# Patient Record
Sex: FEMALE | Race: WHITE | NOT HISPANIC OR LATINO | URBAN - METROPOLITAN AREA
[De-identification: names, ages, dates, MRNs, and addresses within clinical notes are randomized per-mention and may not be internally consistent; named-entity substitution may affect disease eponyms.]

---

## 2023-02-14 ENCOUNTER — ON CAMPUS - OUTPATIENT (OUTPATIENT)
Dept: URBAN - METROPOLITAN AREA HOSPITAL 51 | Facility: HOSPITAL | Age: 83
End: 2023-02-14

## 2023-02-14 DIAGNOSIS — K64.4 RESIDUAL HEMORRHOIDAL SKIN TAGS: ICD-10-CM

## 2023-02-14 DIAGNOSIS — D12.2 BENIGN NEOPLASM OF ASCENDING COLON: ICD-10-CM

## 2023-02-14 DIAGNOSIS — K57.30 DIVERTICULOSIS OF LARGE INTESTINE WITHOUT PERFORATION OR ABS: ICD-10-CM

## 2023-02-14 DIAGNOSIS — K44.9 DIAPHRAGMATIC HERNIA WITHOUT OBSTRUCTION OR GANGRENE: ICD-10-CM

## 2023-02-14 DIAGNOSIS — K64.8 OTHER HEMORRHOIDS: ICD-10-CM

## 2023-02-14 DIAGNOSIS — D64.9 ANEMIA, UNSPECIFIED: ICD-10-CM

## 2023-02-14 DIAGNOSIS — K29.60 OTHER GASTRITIS WITHOUT BLEEDING: ICD-10-CM

## 2023-02-14 DIAGNOSIS — K59.00 CONSTIPATION, UNSPECIFIED: ICD-10-CM

## 2023-02-14 PROCEDURE — 43239 EGD BIOPSY SINGLE/MULTIPLE: CPT | Mod: 51 | Performed by: INTERNAL MEDICINE

## 2023-02-14 PROCEDURE — 45385 COLONOSCOPY W/LESION REMOVAL: CPT | Performed by: INTERNAL MEDICINE

## 2023-02-15 PROBLEM — J30.89 NON-SEASONAL ALLERGIC RHINITIS: Status: ACTIVE | Noted: 2023-02-15

## 2023-02-15 PROBLEM — R13.10 DYSPHAGIA: Status: ACTIVE | Noted: 2023-02-15

## 2023-02-15 PROBLEM — E11.3512 TYPE 2 DIABETES MELLITUS WITH PROLIFERATIVE DIABETIC RETINOPATHY WITH MACULAR EDEMA, LEFT EYE (MULTI): Status: ACTIVE | Noted: 2017-01-24

## 2023-02-15 PROBLEM — D72.819 DECREASED WHITE BLOOD CELL COUNT: Status: ACTIVE | Noted: 2023-02-15

## 2023-02-15 PROBLEM — G25.81 RESTLESS LEGS: Status: ACTIVE | Noted: 2023-02-15

## 2023-02-15 PROBLEM — R79.89 LOW VITAMIN B12 LEVEL: Status: ACTIVE | Noted: 2023-02-15

## 2023-02-15 PROBLEM — E83.19 IRON EXCESS: Status: ACTIVE | Noted: 2023-02-15
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
## 2023-02-15 PROBLEM — I10 BENIGN ESSENTIAL HYPERTENSION: Status: ACTIVE | Noted: 2023-02-15

## 2023-02-15 PROBLEM — D49.2 NEOPLASM OF SKIN OF NOSE: Status: ACTIVE | Noted: 2023-02-15

## 2023-02-15 PROBLEM — M48.20 BAASTRUP'S SYNDROME: Status: ACTIVE | Noted: 2023-02-15

## 2023-02-15 PROBLEM — E53.8 VITAMIN B12 DEFICIENCY: Status: ACTIVE | Noted: 2023-02-15

## 2023-02-15 PROBLEM — R29.898: Status: ACTIVE | Noted: 2023-02-15

## 2023-02-15 PROBLEM — Z86.39 HISTORY OF IRON DEFICIENCY: Status: ACTIVE | Noted: 2023-02-15

## 2023-02-15 PROBLEM — U07.1 COVID-19 VIRUS INFECTION: Status: ACTIVE | Noted: 2023-02-15

## 2023-02-15 PROBLEM — D64.9 ANEMIA: Status: ACTIVE | Noted: 2023-02-15

## 2023-02-15 PROBLEM — R43.0 LOSS OF SENSE OF SMELL: Status: ACTIVE | Noted: 2023-02-15

## 2023-02-15 PROBLEM — J34.89 RHINORRHEA: Status: ACTIVE | Noted: 2023-02-15

## 2023-02-15 PROBLEM — G25.9 MOVEMENT DISORDER: Status: ACTIVE | Noted: 2023-02-15

## 2023-02-15 PROBLEM — R53.81 DEBILITY: Status: ACTIVE | Noted: 2023-02-15

## 2023-02-15 PROBLEM — R63.0 DECREASED APPETITE: Status: ACTIVE | Noted: 2023-02-15

## 2023-02-15 PROBLEM — G20.A1 PARKINSONS DISEASE (MULTI): Status: ACTIVE | Noted: 2023-02-15

## 2023-02-15 PROBLEM — J30.9 ALLERGIC RHINITIS: Status: ACTIVE | Noted: 2023-02-15

## 2023-02-15 PROBLEM — E88.09 PROTEINS SERUM PLASMA LOW: Status: ACTIVE | Noted: 2023-02-15

## 2023-02-15 PROBLEM — R26.89 BALANCE PROBLEM: Status: ACTIVE | Noted: 2023-02-15

## 2023-02-15 PROBLEM — R09.89 DECREASED PULSES IN FEET: Status: ACTIVE | Noted: 2023-02-15

## 2023-02-15 PROBLEM — E78.5 HYPERLIPIDEMIA: Status: ACTIVE | Noted: 2023-02-15

## 2023-02-15 PROBLEM — I47.19 PAROXYSMAL SINUS TACHYCARDIA (CMS-HCC): Status: ACTIVE | Noted: 2023-02-15

## 2023-02-15 PROBLEM — R29.898 WEAKNESS OF LEFT ARM: Status: ACTIVE | Noted: 2023-02-15

## 2023-02-15 PROBLEM — G47.8 AWAKENS FROM SLEEP AT NIGHT: Status: ACTIVE | Noted: 2023-02-15

## 2023-02-15 PROBLEM — R29.2 BABINSKI REFLEX: Status: ACTIVE | Noted: 2023-02-15
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
## 2023-02-15 PROBLEM — E11.42 TYPE 2 DIABETES MELLITUS WITH DIABETIC POLYNEUROPATHY, WITHOUT LONG-TERM CURRENT USE OF INSULIN (MULTI): Status: ACTIVE | Noted: 2023-02-15

## 2023-02-15 PROBLEM — K59.09 CHRONIC CONSTIPATION: Status: ACTIVE | Noted: 2023-02-15

## 2023-02-15 PROBLEM — E55.9 VITAMIN D DEFICIENCY: Status: ACTIVE | Noted: 2023-02-15

## 2023-02-15 PROBLEM — M54.50 THORACOLUMBAR BACK PAIN: Status: ACTIVE | Noted: 2023-02-15

## 2023-02-15 PROBLEM — R29.898 LEFT HAND WEAKNESS: Status: ACTIVE | Noted: 2023-02-15

## 2023-02-15 PROBLEM — F32.A MILD DEPRESSION: Status: ACTIVE | Noted: 2023-02-15

## 2023-02-15 PROBLEM — M85.852 OSTEOPENIA OF NECK OF LEFT FEMUR: Status: ACTIVE | Noted: 2023-02-15

## 2023-02-15 PROBLEM — M54.6 THORACOLUMBAR BACK PAIN: Status: ACTIVE | Noted: 2023-02-15

## 2023-02-15 PROBLEM — G62.9 PERIPHERAL NEUROPATHY: Status: ACTIVE | Noted: 2023-02-15

## 2023-02-15 PROBLEM — K62.5 RECTAL BLEEDING: Status: ACTIVE | Noted: 2023-02-15

## 2023-02-15 PROBLEM — L60.3 DYSTROPHIC NAIL: Status: ACTIVE | Noted: 2023-02-15

## 2023-02-15 PROBLEM — D49.2 NEOPLASM OF SKIN OF SCALP: Status: ACTIVE | Noted: 2023-02-15

## 2023-02-15 RX ORDER — ATORVASTATIN CALCIUM 20 MG/1
1 TABLET, FILM COATED ORAL DAILY
COMMUNITY
End: 2023-07-06 | Stop reason: SDUPTHER

## 2023-02-15 RX ORDER — FERROUS GLUCONATE 324(38)MG
TABLET ORAL
COMMUNITY
Start: 2021-09-28

## 2023-02-15 RX ORDER — UBIDECARENONE 75 MG
1 CAPSULE ORAL DAILY
COMMUNITY
Start: 2022-12-19

## 2023-02-15 RX ORDER — VIT C/E/ZN/COPPR/LUTEIN/ZEAXAN 250MG-90MG
25 CAPSULE ORAL DAILY
COMMUNITY
Start: 2022-12-19

## 2023-02-15 RX ORDER — TRAMADOL HYDROCHLORIDE 50 MG/1
50 TABLET ORAL
COMMUNITY
Start: 2017-01-03

## 2023-02-15 RX ORDER — MIRTAZAPINE 7.5 MG/1
7.5 TABLET, FILM COATED ORAL NIGHTLY
COMMUNITY
Start: 2022-12-19

## 2023-02-15 RX ORDER — PANTOPRAZOLE SODIUM 40 MG/1
1 TABLET, DELAYED RELEASE ORAL DAILY
COMMUNITY
Start: 2022-12-19 | End: 2023-03-28 | Stop reason: WASHOUT

## 2023-02-15 RX ORDER — GABAPENTIN 300 MG/1
300 CAPSULE ORAL DAILY
COMMUNITY

## 2023-02-15 RX ORDER — CARBIDOPA AND LEVODOPA 25; 100 MG/1; MG/1
2 TABLET ORAL
COMMUNITY
Start: 2020-12-08

## 2023-03-07 ENCOUNTER — TELEPHONE (OUTPATIENT)
Dept: PRIMARY CARE | Facility: CLINIC | Age: 83
End: 2023-03-07

## 2023-03-07 NOTE — TELEPHONE ENCOUNTER
Day  the patient's physical therapist reports that Mela has swelling in all her extremities.  Also has some constipation

## 2023-03-08 ENCOUNTER — TELEPHONE (OUTPATIENT)
Dept: PRIMARY CARE | Facility: CLINIC | Age: 83
End: 2023-03-08
Payer: MEDICARE

## 2023-03-08 NOTE — TELEPHONE ENCOUNTER
Bhavya with [facility] called to update you.  They have been seeing her for [facility] and she just did a nursing eval with her for a sore on her bottom.  She reports this is the same Stage 2 ulcer she has had chronically on the tip of her coccyx.  She reports it is dry and tender.  She does not think a dressing will stay on it d/t the location.  They will be applying barrier TID and encourage her to reposition more frequently.  They are also adding a couple of nursing visits to see her.

## 2023-03-08 NOTE — TELEPHONE ENCOUNTER
I called her. Tiffany's number was not given. Pt reports that her feet have been swollen for about a week. Denies SOB, wheezing or other complaints other than being constipated as well for the same amount of time. Pt has apt 3/28

## 2023-03-22 ENCOUNTER — TELEPHONE (OUTPATIENT)
Dept: PRIMARY CARE | Facility: CLINIC | Age: 83
End: 2023-03-22
Payer: MEDICARE

## 2023-03-22 NOTE — TELEPHONE ENCOUNTER
Tanya RN from [facility], would like to start a Duoderm regimen with the pt after eval today. She needs an okay to do these wound care visits. Okay to send?

## 2023-03-27 NOTE — PROGRESS NOTES
Subjective   Patient ID: Mela Kumari is a 82 y.o. female who presents for Hospital Follow-up (Pt here for hospital follow up. Pt still has [facility] nurses coming out to see her at home. She is still getting treatment for her decubitus ulcer as well with wound care.).  LAST VISIT PLAN 1/23/23  MEDICATIONS:no changes in medications    INSTRUCTIONS:agree with home health and glad your family and friends are helping you.  keep vaseline and telfa pad with a sock over it on left foot for blister area to protect it.    TESTING:urine test next visit. we ganesh blood work today.  Dr Schaeffer has blood work orders in your chart, we were not able to draw those today.    REFERRALS:podiatry for diabetic foot and nail care    FOLLOW UP WITH DR. DÍAZ:  Next visit:  2 months  Review swollen legs at visit march 28. Elevating legs she states and is going to try compression socks.   END INFO FROM PRIOR VISIT  HPI  Here for follow up on htn, dependent leg edema, debility, sacral decub ulcer noted by home health nurse. Poor appetite, depresison, dm2.med management. Sees neuro for parkinsons. Home environment worrisome in that she lives alone. She said her son will be moving in with her and another son stops in every night after work. States she is eating ok.she thinks a little better.  Weight remains low.  Today bp is low  She is not on any antihypertensive medication.  She is also not on tramadol which she had from rheum for her back pain, she notes she is going to contact them.  She is on remeron at night dn she is slieeping ok.-this was also to help her appetitie  For anemia she is on iron    No gi or gu c/o. Med list ported in protonix and omeprazole. She is not taking both. Med list problem list and histories reviewed.    Diabetes:Type II:Is taking medications regularly, denies side effects. Januvia 50mg.  Denies hypoglycemia, polyuria, polydipsia.  No new numbness or paresthesias of extremities.No syncope or dizziness.No blurred  "vision.  Lab Results   Component Value Date    HGBA1C 5.9 (A) 05/24/2022    HGBA1C 6.2 (A) 02/22/2022     Lab Results   Component Value Date    CREATININE 0.99 03/28/2023         Lab Results   Component Value Date    GLUCOSE 116 (H) 03/28/2023    CALCIUM 9.1 03/28/2023     03/28/2023    K 4.9 03/28/2023    CO2 26 03/28/2023     (H) 03/28/2023    BUN 36 (H) 03/28/2023    CREATININE 0.99 03/28/2023      Her A1c was 6.9 in January.       Scribe Transcription:  She reports swelling in her feet on going including for the past week. We discussed she should wear compression socks. Nursing can help put them on some.Sleeping in the recliner.admits feet are not up high enough    Anemia work up and low weight She had a HH and small colon polyp otherwise everything was fine on her EGD and colonoscopy. This was on 2/14/23.     We reviewed her medications. Dr. Rice wants her just on omeprazole so I will change that on her med list. She is not taking 2 ppi's    No sob palpitations chest pain  dizziness.She had an echo in May 2022 that showed normal systolic function. She had a CT chest in November that was negative.     She states she got a covid booster recently on March 7.       Objective   Lab Results   Component Value Date    WBC 5.3 03/28/2023    HGB 11.2 (L) 03/28/2023    HCT 36.2 03/28/2023     03/28/2023    CHOL 205 (H) 02/22/2022    TRIG 61 02/22/2022    HDL 82.6 02/22/2022    ALT 11 03/28/2023    AST 25 03/28/2023     03/28/2023    K 4.9 03/28/2023     (H) 03/28/2023    CREATININE 0.99 03/28/2023    BUN 36 (H) 03/28/2023    CO2 26 03/28/2023    TSH 1.95 03/28/2023    HGBA1C 5.9 (A) 05/24/2022     par  Physical ExamBP 88/54 (BP Location: Left arm, Patient Position: Sitting, BP Cuff Size: Adult)   Pulse 84   Resp 16   Ht 1.499 m (4' 11\")   Wt 48.1 kg (106 lb)   BMI 21.41 kg/m²       6/7/2022     4:27 PM 6/7/2022     4:36 PM 7/26/2022     8:31 AM 10/17/2022    11:33 AM 10/27/2022     " "3:04 PM 12/19/2022     5:07 PM 3/28/2023    11:53 AM   Vitals   Systolic 153 148 151 116 108 123 88   Diastolic 73 73 77 82 61 79 54   Heart Rate 87 82 96 92 116 89 84   Temp   36.2 °C (97.1 °F)  37.1 °C (98.7 °F)     Resp   18 18 16 20 16   Height (in)   1.549 m (5' 1\")  1.549 m (5' 1\") 1.549 m (5' 1\") 1.499 m (4' 11\")   Weight (lb) 104.25  109.38 102 102 105.8 106   BMI 20.36 kg/m2  20.67 kg/m2 19.27 kg/m2 19.27 kg/m2 19.99 kg/m2 21.41 kg/m2   BSA (m2) 1.42 m2  1.46 m2 1.41 m2 1.41 m2 1.44 m2 1.42 m2   Visit Report       Report       Patient looks frail speaks slowly using walker is here by herself. Did not drive here however.  HEENT:   Normocephalic, no facial asymmetry  Eyes: Sclera and conjunctiva are clear.  Neck: No adenopathy cervical (Ant/post/lat), no Supraclavicular nodes.   Thyroid normal.   Carotid pulses normal, no carotid bruits.  Lungs : RR normal. Clear to auscultation anterior, posterior and lateral. No rales, wheezes rhonchi or rubs. Good air exchange.  Heart: RRR. No Murmur, gallop, click or rub.  Vascular:  Posterior tibialis and dorsalis pedis pulses 1+ bilaterally.   Extremities: no upper extremity edema.   Has 1-2+edema lower legs and ankles  .Musculoskeletal: no synovitis of joints seen. No new deformity.  Neuro: CN 2-12 intact. Alert, appropriate.   Ambulation-  Slow to move, using walker, slow to speak. No tremors. Typical of her parkinsons.  Psych: normal mood and flat affect.  Skin: No rash, bruising petechiae or jaundice. 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 Hair is thin and scalp can be visualized.  Has shallow sacral ulcer top of gluteal crease without cellulitis. No odor. Edges appear to be healing slightly. Discussed not sitting on this area.  .feet swollen but no lesions or skin breakdown. Has neuropathy  Negative homans.          Assessment/Plan   Problem List " Items Addressed This Visit          Nervous    Peripheral neuropathy       Circulatory    Benign essential hypertension       Endocrine/Metabolic    Type 2 diabetes mellitus with proliferative diabetic retinopathy with macular edema, left eye (CMS/HCC)    Relevant Medications    SITagliptin phosphate (Januvia) 50 mg tablet       Hematologic    Low vitamin B12 level-supplement and monitor levels       Other    Mild depression-stable on meds -pt notes ok    Proteins serum plasma low son bringing meals, encouraged to increase      Other Visit Diagnoses       Edema of both lower legs    -  Primary    Relevant Orders    CBC and Auto Differential (Completed)    Comprehensive metabolic panel (Completed)    TSH with reflex to Free T4 if abnormal (Completed)    Hypotension, unspecified hypotension type    -eiology unclear, will have bp checked at home. She is not symptomatic with this.cl labs/    Relevant Orders    CBC and Auto Differential (Completed)    Comprehensive metabolic panel (Completed)    TSH with reflex to Free T4 if abnormal (Completed)    Hiatal hernia        Relevant Medications    omeprazole OTC (PriLOSEC OTC) 20 mg EC tablet     Decubitus ulcer sacral, not infected on exam today, I have ordered wound care to go out to the house and they are seeing her.   See patient instructions for additional treatment plan.

## 2023-03-28 ENCOUNTER — OFFICE VISIT (OUTPATIENT)
Dept: PRIMARY CARE | Facility: CLINIC | Age: 83
End: 2023-03-28
Payer: MEDICARE

## 2023-03-28 ENCOUNTER — LAB (OUTPATIENT)
Dept: LAB | Facility: LAB | Age: 83
End: 2023-03-28
Payer: MEDICARE

## 2023-03-28 VITALS
WEIGHT: 106 LBS | HEART RATE: 84 BPM | DIASTOLIC BLOOD PRESSURE: 54 MMHG | BODY MASS INDEX: 21.37 KG/M2 | SYSTOLIC BLOOD PRESSURE: 88 MMHG | RESPIRATION RATE: 16 BRPM | HEIGHT: 59 IN

## 2023-03-28 DIAGNOSIS — K44.9 HIATAL HERNIA: ICD-10-CM

## 2023-03-28 DIAGNOSIS — I10 BENIGN ESSENTIAL HYPERTENSION: ICD-10-CM

## 2023-03-28 DIAGNOSIS — R60.0 EDEMA OF BOTH LOWER LEGS: Primary | ICD-10-CM

## 2023-03-28 DIAGNOSIS — F32.A MILD DEPRESSION: ICD-10-CM

## 2023-03-28 DIAGNOSIS — L89.159 PRESSURE INJURY OF SKIN OF SACRAL REGION, UNSPECIFIED INJURY STAGE: ICD-10-CM

## 2023-03-28 DIAGNOSIS — R79.89 LOW VITAMIN B12 LEVEL: ICD-10-CM

## 2023-03-28 DIAGNOSIS — I95.9 HYPOTENSION, UNSPECIFIED HYPOTENSION TYPE: ICD-10-CM

## 2023-03-28 DIAGNOSIS — E11.3512 TYPE 2 DIABETES MELLITUS WITH LEFT EYE AFFECTED BY PROLIFERATIVE RETINOPATHY AND MACULAR EDEMA, WITHOUT LONG-TERM CURRENT USE OF INSULIN (MULTI): ICD-10-CM

## 2023-03-28 DIAGNOSIS — R60.0 EDEMA OF BOTH LOWER LEGS: ICD-10-CM

## 2023-03-28 DIAGNOSIS — E44.1 MILD PROTEIN-CALORIE MALNUTRITION (MULTI): ICD-10-CM

## 2023-03-28 DIAGNOSIS — G62.89 OTHER POLYNEUROPATHY: ICD-10-CM

## 2023-03-28 DIAGNOSIS — E88.09 PROTEINS SERUM PLASMA LOW: ICD-10-CM

## 2023-03-28 DIAGNOSIS — G20.A1 PARKINSONS DISEASE (MULTI): ICD-10-CM

## 2023-03-28 PROCEDURE — 84443 ASSAY THYROID STIM HORMONE: CPT

## 2023-03-28 PROCEDURE — 99215 OFFICE O/P EST HI 40 MIN: CPT | Performed by: INTERNAL MEDICINE

## 2023-03-28 PROCEDURE — 36415 COLL VENOUS BLD VENIPUNCTURE: CPT

## 2023-03-28 PROCEDURE — 1160F RVW MEDS BY RX/DR IN RCRD: CPT | Performed by: INTERNAL MEDICINE

## 2023-03-28 PROCEDURE — 1036F TOBACCO NON-USER: CPT | Performed by: INTERNAL MEDICINE

## 2023-03-28 PROCEDURE — 85025 COMPLETE CBC W/AUTO DIFF WBC: CPT

## 2023-03-28 PROCEDURE — 3078F DIAST BP <80 MM HG: CPT | Performed by: INTERNAL MEDICINE

## 2023-03-28 PROCEDURE — 3074F SYST BP LT 130 MM HG: CPT | Performed by: INTERNAL MEDICINE

## 2023-03-28 PROCEDURE — 80053 COMPREHEN METABOLIC PANEL: CPT

## 2023-03-28 PROCEDURE — 1159F MED LIST DOCD IN RCRD: CPT | Performed by: INTERNAL MEDICINE

## 2023-03-28 RX ORDER — CYANOCOBALAMIN (VITAMIN B-12) 1000MCG/ML
1 DROPS ORAL NIGHTLY PRN
COMMUNITY
End: 2023-07-06 | Stop reason: ALTCHOICE

## 2023-03-28 RX ORDER — OMEPRAZOLE 20 MG/1
20 TABLET, DELAYED RELEASE ORAL
COMMUNITY
End: 2023-03-28 | Stop reason: SDUPTHER

## 2023-03-28 RX ORDER — OMEPRAZOLE 20 MG/1
20 TABLET, DELAYED RELEASE ORAL DAILY
Qty: 90 TABLET | Refills: 1 | Status: SHIPPED | OUTPATIENT
Start: 2023-03-28

## 2023-03-28 ASSESSMENT — PAIN SCALES - GENERAL: PAINLEVEL: 0-NO PAIN

## 2023-03-28 NOTE — PATIENT INSTRUCTIONS
Stay on the Januvia for your diabetes.  For your stomach you should be on omeprazole 20mg once per day. You are not taking pantoprazole.    Keep up with wound care. Lay on your side more than your back when you can.  Glad that your endoscopy and colonoscopy did not show anything worrisome.    ELEVATE legs above level of heart for 20-30 minutes twice per day.  Wear knee high compression socks.    Your blood pressure is too low to start a water pill.  Blood test today to check to see why your blood pressure is low.  Nurse to recheck blood pressure and let us know what it is.    Appt. In 3-6 weeks to check on weight and decubitus ulcer.

## 2023-03-29 LAB
ALANINE AMINOTRANSFERASE (SGPT) (U/L) IN SER/PLAS: 11 U/L (ref 7–45)
ALBUMIN (G/DL) IN SER/PLAS: 3.9 G/DL (ref 3.4–5)
ALKALINE PHOSPHATASE (U/L) IN SER/PLAS: 105 U/L (ref 33–136)
ANION GAP IN SER/PLAS: 12 MMOL/L (ref 10–20)
ASPARTATE AMINOTRANSFERASE (SGOT) (U/L) IN SER/PLAS: 25 U/L (ref 9–39)
BASOPHILS (10*3/UL) IN BLOOD BY AUTOMATED COUNT: 0.02 X10E9/L (ref 0–0.1)
BASOPHILS/100 LEUKOCYTES IN BLOOD BY AUTOMATED COUNT: 0.4 % (ref 0–2)
BILIRUBIN TOTAL (MG/DL) IN SER/PLAS: 0.3 MG/DL (ref 0–1.2)
CALCIUM (MG/DL) IN SER/PLAS: 9.1 MG/DL (ref 8.6–10.6)
CARBON DIOXIDE, TOTAL (MMOL/L) IN SER/PLAS: 26 MMOL/L (ref 21–32)
CHLORIDE (MMOL/L) IN SER/PLAS: 111 MMOL/L (ref 98–107)
CREATININE (MG/DL) IN SER/PLAS: 0.99 MG/DL (ref 0.5–1.05)
EOSINOPHILS (10*3/UL) IN BLOOD BY AUTOMATED COUNT: 0.11 X10E9/L (ref 0–0.4)
EOSINOPHILS/100 LEUKOCYTES IN BLOOD BY AUTOMATED COUNT: 2.1 % (ref 0–6)
ERYTHROCYTE DISTRIBUTION WIDTH (RATIO) BY AUTOMATED COUNT: 14 % (ref 11.5–14.5)
ERYTHROCYTE MEAN CORPUSCULAR HEMOGLOBIN CONCENTRATION (G/DL) BY AUTOMATED: 30.9 G/DL (ref 32–36)
ERYTHROCYTE MEAN CORPUSCULAR VOLUME (FL) BY AUTOMATED COUNT: 101 FL (ref 80–100)
ERYTHROCYTES (10*6/UL) IN BLOOD BY AUTOMATED COUNT: 3.6 X10E12/L (ref 4–5.2)
GFR FEMALE: 57 ML/MIN/1.73M2
GLUCOSE (MG/DL) IN SER/PLAS: 116 MG/DL (ref 74–99)
HEMATOCRIT (%) IN BLOOD BY AUTOMATED COUNT: 36.2 % (ref 36–46)
HEMOGLOBIN (G/DL) IN BLOOD: 11.2 G/DL (ref 12–16)
IMMATURE GRANULOCYTES/100 LEUKOCYTES IN BLOOD BY AUTOMATED COUNT: 0.2 % (ref 0–0.9)
LEUKOCYTES (10*3/UL) IN BLOOD BY AUTOMATED COUNT: 5.3 X10E9/L (ref 4.4–11.3)
LYMPHOCYTES (10*3/UL) IN BLOOD BY AUTOMATED COUNT: 1.7 X10E9/L (ref 0.8–3)
LYMPHOCYTES/100 LEUKOCYTES IN BLOOD BY AUTOMATED COUNT: 31.9 % (ref 13–44)
MONOCYTES (10*3/UL) IN BLOOD BY AUTOMATED COUNT: 0.47 X10E9/L (ref 0.05–0.8)
MONOCYTES/100 LEUKOCYTES IN BLOOD BY AUTOMATED COUNT: 8.8 % (ref 2–10)
NEUTROPHILS (10*3/UL) IN BLOOD BY AUTOMATED COUNT: 3.02 X10E9/L (ref 1.6–5.5)
NEUTROPHILS/100 LEUKOCYTES IN BLOOD BY AUTOMATED COUNT: 56.6 % (ref 40–80)
NRBC (PER 100 WBCS) BY AUTOMATED COUNT: 0 /100 WBC (ref 0–0)
PLATELETS (10*3/UL) IN BLOOD AUTOMATED COUNT: 272 X10E9/L (ref 150–450)
POTASSIUM (MMOL/L) IN SER/PLAS: 4.9 MMOL/L (ref 3.5–5.3)
PROTEIN TOTAL: 6.2 G/DL (ref 6.4–8.2)
SODIUM (MMOL/L) IN SER/PLAS: 144 MMOL/L (ref 136–145)
THYROTROPIN (MIU/L) IN SER/PLAS BY DETECTION LIMIT <= 0.05 MIU/L: 1.95 MIU/L (ref 0.44–3.98)
UREA NITROGEN (MG/DL) IN SER/PLAS: 36 MG/DL (ref 6–23)

## 2023-04-08 PROBLEM — R53.1 GENERALIZED WEAKNESS: Status: ACTIVE | Noted: 2022-11-06

## 2023-04-08 PROBLEM — M65.4 RADIAL STYLOID TENOSYNOVITIS: Status: RESOLVED | Noted: 2023-04-08 | Resolved: 2023-04-08

## 2023-04-08 PROBLEM — R63.0 ANOREXIA: Status: ACTIVE | Noted: 2022-11-07

## 2023-04-08 PROBLEM — M25.50 ARTHRALGIA: Status: RESOLVED | Noted: 2023-04-08 | Resolved: 2023-04-08

## 2023-04-08 PROBLEM — I47.19 PAROXYSMAL SINUS TACHYCARDIA (CMS-HCC): Status: RESOLVED | Noted: 2023-02-15 | Resolved: 2023-04-08

## 2023-04-08 PROBLEM — E78.5 HLD (HYPERLIPIDEMIA): Status: ACTIVE | Noted: 2022-09-08

## 2023-04-08 PROBLEM — U07.1 COVID-19 VIRUS INFECTION: Status: RESOLVED | Noted: 2023-02-15 | Resolved: 2023-04-08

## 2023-04-08 PROBLEM — K21.9 GERD (GASTROESOPHAGEAL REFLUX DISEASE): Status: ACTIVE | Noted: 2022-09-08

## 2023-04-08 PROBLEM — M89.9 DISORDER OF BONE, UNSPECIFIED: Status: ACTIVE | Noted: 2023-04-08

## 2023-04-08 PROBLEM — E46 PROTEIN-CALORIE MALNUTRITION (MULTI): Status: RESOLVED | Noted: 2022-11-07 | Resolved: 2023-04-08

## 2023-04-08 PROBLEM — M70.52 BURSITIS OF LEFT KNEE: Status: RESOLVED | Noted: 2023-04-08 | Resolved: 2023-04-08

## 2023-04-08 PROBLEM — G20.A1 PARKINSON DISEASE (MULTI): Status: ACTIVE | Noted: 2022-09-08

## 2023-04-08 PROBLEM — R63.4 ABNORMAL WEIGHT LOSS: Status: ACTIVE | Noted: 2023-04-08

## 2023-04-08 PROBLEM — R07.9 CHEST PAIN: Status: RESOLVED | Noted: 2022-11-07 | Resolved: 2023-04-08

## 2023-04-08 PROBLEM — I10 HTN (HYPERTENSION): Status: RESOLVED | Noted: 2022-09-08 | Resolved: 2023-04-08

## 2023-04-08 NOTE — RESULT ENCOUNTER NOTE
Call pt. Thyroid blood test good. Chemistries are ok, mildly dehydrated, drink more liquids. Anemia is mild and stable, same as in January.   Would repeat blood test in 3-4 months cbc bmp.

## 2023-05-16 ENCOUNTER — DOCUMENTATION (OUTPATIENT)
Dept: PRIMARY CARE | Facility: CLINIC | Age: 83
End: 2023-05-16

## 2023-05-16 ENCOUNTER — TELEPHONE (OUTPATIENT)
Dept: PRIMARY CARE | Facility: CLINIC | Age: 83
End: 2023-05-16

## 2023-05-16 RX ORDER — POLYETHYLENE GLYCOL 3350 17 G/17G
17 POWDER, FOR SOLUTION ORAL
Qty: 510 G | Refills: 2 | COMMUNITY
Start: 2023-04-28 | End: 2023-07-06 | Stop reason: ALTCHOICE

## 2023-05-16 RX ORDER — ACETAMINOPHEN 325 MG/1
2 TABLET ORAL EVERY 6 HOURS PRN
COMMUNITY
Start: 2023-04-27

## 2023-05-16 NOTE — PROGRESS NOTES
Discharge Facility: [facility]  Discharge Diagnosis: UTI (urinary tract infection); generalized weakness; Parkinson's disease  Admission Date: 4/28/2023  Discharge Date: 5/15/2023    PCP Appointment Date: 5/16/2023  Specialist Appointment Date: DALE  Hospital Encounter and Summary: Linked (hospital event before SNF)  This patient has a follow up scheduled with PCP within 2 business days of DC on 5/16/2023 which counts as outreach after discharge.

## 2023-05-16 NOTE — TELEPHONE ENCOUNTER
Patient calling,was not feeling well,slept through her appt time. Asking for a new appt. Do not see anything workable  through the next month. How to address.

## 2023-05-23 ENCOUNTER — APPOINTMENT (OUTPATIENT)
Dept: PRIMARY CARE | Facility: CLINIC | Age: 83
End: 2023-05-23
Payer: MEDICARE

## 2023-05-25 ENCOUNTER — PATIENT OUTREACH (OUTPATIENT)
Dept: PRIMARY CARE | Facility: CLINIC | Age: 83
End: 2023-05-25
Payer: MEDICARE

## 2023-05-25 NOTE — PROGRESS NOTES
Discharge Facility: [facility]  Discharge Diagnosis: generalized weakness  Admission Date: 5/17/23  Discharge Date: 5/23/23    PCP Appointment Date: TBD  Specialist Appointment Date: N/A  Hospital Encounter and Summary: Linked      2 attempts were made to reach patient to assess needs.   No return call as of this note.

## 2023-05-26 ENCOUNTER — TELEPHONE (OUTPATIENT)
Dept: PRIMARY CARE | Facility: CLINIC | Age: 83
End: 2023-05-26
Payer: MEDICARE

## 2023-05-26 DIAGNOSIS — N39.0 URINARY TRACT INFECTION, ACUTE: ICD-10-CM

## 2023-05-26 DIAGNOSIS — Z97.8 FOLEY CATHETER IN PLACE: Primary | ICD-10-CM

## 2023-05-26 NOTE — TELEPHONE ENCOUNTER
"I called the pt to notify her of the referral and reached a vm message stating the mailbox is full.  I called \"work\" number listed and reached re cording for [facility] (I did not leave a message)  "

## 2023-05-26 NOTE — TELEPHONE ENCOUNTER
"   Nurse Jenny of [facility] called regarding the pt.  Pt has been discharged with a urine catheter for \"urinary retention. She has an uti.\"   She stated.  There is no urologist in the discharge papers.   \"Only a PCP, PA and neurologist. \"   Jenny needs orders for catheter care. Or a urologist the pt may see or a referral to an urologist.   Call Jenny at .   "

## 2023-05-30 ENCOUNTER — TELEPHONE (OUTPATIENT)
Dept: PRIMARY CARE | Facility: CLINIC | Age: 83
End: 2023-05-30
Payer: MEDICARE

## 2023-05-30 NOTE — TELEPHONE ENCOUNTER
I called the pt, notified her and gave her the number to call and  schedule an appoint with urology. Pt verbalized understanding.

## 2023-05-30 NOTE — TELEPHONE ENCOUNTER
JENNIFER Palacios from [facility] calls in. She was at pt home today for follow up and RN visit. Pt told her that she pulled her catheter and wants to know if she still should follow up with URO, also, need order for social work to come to the home and get her some help. She also needs an apt.

## 2023-06-01 ENCOUNTER — TELEPHONE (OUTPATIENT)
Dept: PRIMARY CARE | Facility: CLINIC | Age: 83
End: 2023-06-01
Payer: MEDICARE

## 2023-06-01 NOTE — TELEPHONE ENCOUNTER
RN from [facility] will be seeing pt and will give her appt info-can you please add ehr at 8am on June 20th for KMKACEY

## 2023-06-09 ENCOUNTER — PATIENT OUTREACH (OUTPATIENT)
Dept: PRIMARY CARE | Facility: CLINIC | Age: 83
End: 2023-06-09
Payer: MEDICARE

## 2023-06-09 NOTE — PROGRESS NOTES
Call regarding hospitalization.  Patient has not had follow up with PCP at this time. Has appointment 6/20/23.   At time of outreach call the patient feels as if their condition has improved since last visit.  Reviewed the PCP appointment with the pt and addressed any questions or concerns.

## 2023-06-28 ENCOUNTER — PATIENT OUTREACH (OUTPATIENT)
Dept: PRIMARY CARE | Facility: CLINIC | Age: 83
End: 2023-06-28
Payer: MEDICARE

## 2023-06-28 ENCOUNTER — DOCUMENTATION (OUTPATIENT)
Dept: PRIMARY CARE | Facility: CLINIC | Age: 83
End: 2023-06-28
Payer: MEDICARE

## 2023-06-28 NOTE — PROGRESS NOTES
Discharge Facility: [facility]  Discharge Diagnosis: Generalized weakness  Admission Date: 6/14/2023  Discharge Date:  6/26/2023    PCP Appointment Date: 7/24 outside of rec 14 days after discharge  Specialist Appointment Date: TBD  Hospital Encounter and Summary: Linked  Unable to reach patient during two business days after discharge despite at least two attempts made.

## 2023-07-05 ENCOUNTER — TELEPHONE (OUTPATIENT)
Dept: PRIMARY CARE | Facility: CLINIC | Age: 83
End: 2023-07-05
Payer: MEDICARE

## 2023-07-05 ENCOUNTER — DOCUMENTATION (OUTPATIENT)
Dept: PRIMARY CARE | Facility: CLINIC | Age: 83
End: 2023-07-05
Payer: MEDICARE

## 2023-07-05 RX ORDER — CEFDINIR 300 MG/1
300 CAPSULE ORAL 2 TIMES DAILY
Qty: 4 CAPSULE | Refills: 0 | COMMUNITY
Start: 2023-07-03 | End: 2023-07-06 | Stop reason: ALTCHOICE

## 2023-07-05 RX ORDER — FEEDING PUMP
8 EACH MISCELLANEOUS
COMMUNITY
Start: 2023-06-14

## 2023-07-05 NOTE — PROGRESS NOTES
Discharge Facility: [facility]  Discharge Diagnosis: Weakness  Admission Date: 07/01/2023   Discharge Date:  07/03/2023     PCP Appointment Date: 7/6/2023  Specialist Appointment Date: TBD  Hospital Encounter and Summary: Linked   This patient has a follow up scheduled with PCP within 2 business days of DC on 7/6/2023.

## 2023-07-05 NOTE — TELEPHONE ENCOUNTER
Bhavya-home care nurse from [facility] called and requested a verbal order to add Physical therapy and Medical Social worker to her existing home care order. She seen pt this morning and stated she is very weak and need more help at home than they can provide. She stated you can call her back and if no answer then a verbal ok can be left on voice mail, it is a secure medical line.   [phone]

## 2023-07-06 ENCOUNTER — OFFICE VISIT (OUTPATIENT)
Dept: PRIMARY CARE | Facility: CLINIC | Age: 83
End: 2023-07-06
Payer: MEDICARE

## 2023-07-06 VITALS
DIASTOLIC BLOOD PRESSURE: 60 MMHG | WEIGHT: 127 LBS | RESPIRATION RATE: 18 BRPM | HEART RATE: 84 BPM | BODY MASS INDEX: 26.66 KG/M2 | HEIGHT: 58 IN | SYSTOLIC BLOOD PRESSURE: 128 MMHG

## 2023-07-06 DIAGNOSIS — Z09 HOSPITAL DISCHARGE FOLLOW-UP: Primary | ICD-10-CM

## 2023-07-06 DIAGNOSIS — R60.0 EDEMA OF BOTH LOWER LEGS: ICD-10-CM

## 2023-07-06 DIAGNOSIS — E11.65 TYPE 2 DIABETES MELLITUS WITH HYPERGLYCEMIA, WITHOUT LONG-TERM CURRENT USE OF INSULIN (MULTI): ICD-10-CM

## 2023-07-06 DIAGNOSIS — G20.A1 PARKINSONS DISEASE (MULTI): ICD-10-CM

## 2023-07-06 DIAGNOSIS — N39.0 URINARY TRACT INFECTION WITHOUT HEMATURIA, SITE UNSPECIFIED: ICD-10-CM

## 2023-07-06 DIAGNOSIS — R54 FRAILTY: ICD-10-CM

## 2023-07-06 DIAGNOSIS — E78.49 OTHER HYPERLIPIDEMIA: ICD-10-CM

## 2023-07-06 DIAGNOSIS — E11.3512 TYPE 2 DIABETES MELLITUS WITH LEFT EYE AFFECTED BY PROLIFERATIVE RETINOPATHY AND MACULAR EDEMA, WITHOUT LONG-TERM CURRENT USE OF INSULIN (MULTI): ICD-10-CM

## 2023-07-06 DIAGNOSIS — D53.9 ANEMIA ASSOCIATED WITH NUTRITIONAL DEFICIENCY: ICD-10-CM

## 2023-07-06 DIAGNOSIS — E44.1 MILD PROTEIN-CALORIE MALNUTRITION (MULTI): ICD-10-CM

## 2023-07-06 DIAGNOSIS — G62.89 OTHER POLYNEUROPATHY: ICD-10-CM

## 2023-07-06 DIAGNOSIS — R53.1 WEAK: ICD-10-CM

## 2023-07-06 PROCEDURE — 1160F RVW MEDS BY RX/DR IN RCRD: CPT | Performed by: INTERNAL MEDICINE

## 2023-07-06 PROCEDURE — 1159F MED LIST DOCD IN RCRD: CPT | Performed by: INTERNAL MEDICINE

## 2023-07-06 PROCEDURE — 1036F TOBACCO NON-USER: CPT | Performed by: INTERNAL MEDICINE

## 2023-07-06 PROCEDURE — 3074F SYST BP LT 130 MM HG: CPT | Performed by: INTERNAL MEDICINE

## 2023-07-06 PROCEDURE — 99496 TRANSJ CARE MGMT HIGH F2F 7D: CPT | Performed by: INTERNAL MEDICINE

## 2023-07-06 PROCEDURE — 1126F AMNT PAIN NOTED NONE PRSNT: CPT | Performed by: INTERNAL MEDICINE

## 2023-07-06 PROCEDURE — 3078F DIAST BP <80 MM HG: CPT | Performed by: INTERNAL MEDICINE

## 2023-07-06 RX ORDER — ATORVASTATIN CALCIUM 20 MG/1
20 TABLET, FILM COATED ORAL DAILY
Qty: 90 TABLET | Refills: 3
Start: 2023-07-06

## 2023-07-06 NOTE — PROGRESS NOTES
"Patient: Mela Kumari  : 1940  PCP: Maggie Kumar MD  MRN: [id number]  Program: No linked episodes     Mela Kumari is a 82 y.o. female presenting today for follow-up after being discharged from the hospital 3 days ago. The main problem requiring admission was weakness, uti.. the patient wears depends and son states she was not changing them throughout the day and he did not realize this until recently. He thinks this is contributing to the problem. Also discussed hydration, she is not great at this and he is working on it with her.  She is not checking sugars, and is not on diabetes medication now.  Will ck A1c.    The discharge summary and/or Transitional Care Management documentation was reviewed. Medication reconciliation was performed as indicated via the \"Damien as Reviewed\" timestamp.     Mela Kumari was contacted by Transitional Care Management services two days after her discharge. This encounter and supporting documentation was reviewed.    The complexity of medical decision making for this patient's transitional care is high.  \omnicef finished yesterday.    Discharge Summaries  - documented in this encounter  Ellen Hubbard DO - 2023 1:59 PM EDT  Formatting of this note is different from the original.  Images from the original note were not included.      DISCHARGE SUMMARY    PATIENT NAME: Mela Kumari ADMISSION DATE: 2023  MRN: [id number] DISCHARGE DATE: 7/3/2023    ATTENDING PHYSICIAN: Ellen Hubbard DO Code Status: DNR-CCA    Highest Readmission Risk Score: 18  The 30 day readmissions risk score is derived from an internally validated risk model which evaluates patient level characteristics, utilization history, medication orders and lab results up until the day of discharge. Patients with a score of 40 or above are considered highest risk for readmission. Specific patient level drivers will be listed at the bottom of the summary.      CONSULTING TEAMS DURING " HOSPITALIZATION:  Treatment Team:  Attending Provider: Ellen Hubbard DO  REASON FOR HOSPITALIZATION: Weakness    DIAGNOSIS:  Principal Problem (Resolved):  Weakness POA: Yes  Active Problems:  Parkinson disease (HCC) POA: Yes  HTN (hypertension) POA: Yes  Resolved Problems:  UTI (urinary tract infection) POA: Yes    OPERATIONS DURING HOSPITALIZATION: None    PROCEDURES DURING HOSPITALIZATION: No procedures performed    HOSPITAL COURSE:  This is a 82 year old female hx of Parkinsons, HTN, HPL, DJD and GERD who presents with weakness. Her son is at bedside and provides history also. She was hospitalized about 3 weeks ago with a UTI (appears it was bacturia and not UTI) and then sent to SNF. She has been home about a week and now declining. He reports she was doing better on arrival home but in the past one day she has gotten worse. She is more confused and weak, unable to ambulate. She is normally ambulatory with a walker. This is her 4th hospital stay in the past 3 months all related to UTIs. They report she was doing very well prior to ana COVID in September. She was independent in ambulation and did not have frequent hospitalizations. Since then it has gotten worse. Of note she was persistently covid postiive thru 2/2023. She has not had any respiratory symptoms but her parkisons seems to have worsened. No fever but she does feels chilled. Denies any chest pain, SOB abd pain, N/V. She reports intermittent leg swelling  In the ED VSS. Labs showed normal cbc and cmp. UA with 1= LE, many bacteria. HS trop 40-->37->37. CXR was unremarkable    On admission US of LE was done and neg for DVT. Given her son's reported acute change in status she was started on antibiotics with ceftriaxone. Urine culture again showed mixed microbiotia. She improved over the next 2 days. Mental status returned to baseline and she was able to ambulate with PT. Home health was recommended and ordered. The patient felt well and  desired to go home. She was discharged in stable condition. We discussed possible long covid symptoms worsening her parkinsons symptoms and referral to long covid clinic. She declined at this time and will follow up with her PCP if she changes her mind    Transitions of Care Critical Issues:  SPECIALIST FOLLOW-UP: Dr. Kumar one week    LABS AND PROCEDURES PENDING AT DISCHARGE: No pending results.        PATIENT CONDITION AT DISCHARGE: Stable    DISCHARGE DISPOSITION:    Home with Home Health    Discharge Physical Exam:  VITAL SIGNS: /50  Pulse 79  Temp 36.3 °C (97.3 °F) (Axillary)  Resp 18  Ht 152.4 cm (5')  Wt 62.9 kg (138 lb 10.7 oz)  SpO2 97%  BMI 27.08 kg/m²  GENERAL: Alert, no distress, cooperative  LUNGS: Lungs clear to auscultation, Good diaphragmatic excursion  CARDIAC: Normal S1 and S2; no rubs, murmurs, or gallops  ABDOMEN: Abdomen soft, non-tender, BS normal,  EXTREMITIES: Extremities normal, no deformities, edema, clubbing or skin discoloration.    INFORMATION PROVIDED TO PATIENT: see discharge instructions    WOUND/SURGICAL SITE CARE: None    DIET: Resume pre-hospital diet    ACTIVITY: Resume pre-hospital activity    ALLERGIES  No Known Allergies    DISCHARGE MEDICATION:    Medication List      START taking these medications    cefdinir 300 mg capsule  Commonly known as: OMNICEF  Take 1 capsule by mouth twice daily for 2 days.  CONTINUE taking these medications    acetaminophen 500 mg tablet  Commonly known as: TYLENOL  Take 2 tablets by mouth every 8 hours as needed for pain.    carbidopa-levodopa  mg per tablet  Commonly known as: SINEMET     cyanocobalamin 500 mcg tablet  Commonly known as: VITAMIN B-12  Take 1 tablet by mouth once daily.    ENSURE HIGH PROTEIN Liqd  Generic drug: Food Supplement, Lactose-Free  Take 237 mL by mouth three times daily with meals.    ferrous gluconate 324 mg (37.5 mg iron) tablet    gabapentin 300 mg capsule  Commonly known as:  NEURONTIN    Mirtazapine 7.5 mg tablet  Commonly known as: Remeron    psyllium 3.4 gram packet  Commonly known as: METAMUCIL  Take 1 Packet by mouth twice daily.      Objective: labs reviewed   Related to CBC  Component 07/03/23 07/02/23 07/01/23 06/14/23 06/13/23 06/12/23   WBC 5.15 4.90 4.61 6.35 4.38 5.11   RBC 3.46 Low  3.42 Low  3.63 Low  3.55 Low  3.47 Low  3.25 Low    Hemoglobin 11.0 Low  10.8 Low  11.6 11.2 Low  10.9 Low  10.5 Low    Hematocrit 32.4 Low  32.3 Low  34.3 Low  33.9 Low  32.9 Low  30.7 Low    MCV 93.6 94.4 94.5 95.5 94.8 94.5   MCH 31.8 31.6 32.0 31.5 31.4 32.3   MCHC 34.0 33.4 33.8 33.0 33.1 34.2   RDW-CV 13.3 13.6 13.5 13.2 13.5 13.7     Component 07/03/23 07/02/23 07/01/23 06/14/23 06/13/23 06/12/23   Glucose 150 High   129 High   140 High   145 High   157 High   132 High     BUN 20 21 26 High  23 High  24 High  28 High    Creatinine 0.63 0.72 0.75 0.91 1.11 High  0.89   Sodium 141 143 144 144 144 144   Potassium 4.2 4.4 4.3 4.8 4.8 4.2   Chloride 108 High  110 High  108 High  108 High  108 High  109 High    CO2 26 24 26 26 27 24   Anion Gap 7 Low  9 10 10 9 11     A1c January 6.9    Review of systems:  Cardiac: No CP , palpitations, increased aquino  Resp: No sob, wheezing, cough  Extremities: No leg or ankle swelling, no claudication  Neuro: No dizziness, syncope, blurred vision. No new headaches.  Denies night sweats, fevers,   Leg swelling -has some chronically, sits a lot, it is not bad.  Has a sore top gluteal creas in the back they are using creams on it. They do have aquafor at home.  Parkinsons is about the same, she sees neurology.  Reviewed bowels and how to avoid constipation, no diarrhea. No blood in urine or stool  No abd pain hb.  Appetite is so so not particularly new.  Physical Exam  Patient looks frail speaks slowly using walker, is here with her son. One son is staying with her and the other visits. She has hhc.  HEENT:   Normocephalic, no facial asymmetry  Eyes: Sclera and  conjunctiva are clear.  Neck: No adenopathy cervical (Ant/post/lat), no Supraclavicular nodes.   Thyroid normal.   Carotid pulses normal, no carotid bruits.  Lungs : RR normal. Clear to auscultation anterior, posterior and lateral. No rales, wheezes rhonchi or rubs. Good air exchange.  Heart: RRR. No Murmur, gallop, click or rub.  Vascular:  Posterior tibialis and dorsalis pedis pulses 1+ bilaterally. Have been decreased.  Extremities: no upper extremity edema.   Has 1+edema lower legs and ankles  Musculoskeletal: no synovitis of joints seen. No new deformity.  Neuro: CN 2-12 intact. Alert, appropriate.   Ambulation-Slow to move, using walker, slow to speak. No tremors. Typical of her parkinsons.  Has known neuropathy of feet.  Feet always a little dusky actually seem less so today.    Psych: normal mood normal affect. She wants to stay at home and son also wants her to stay at home and taking care of her between he, his brother and [facility].  Skin: No rash, bruising petechiae or jaundice. Thin hair, not new.  Has shallow sore, top of gluteal crease. Home health is looking at this. They have aquafor at home. It is superficial and no infection or drainage. She is avoiding sitting on this.      Family History   Problem Relation Name Age of Onset    Alzheimer's disease Mother      Heart attack Father      Epilepsy Brother      Diabetes Brother      Parkinsonism Other Grandmother     Colon cancer Maternal Great-Grandmother       TCM note:  Discharge [facility]  Discharge Diagnosis: Weakness  Admission Date: 07/01/2023   Discharge Date:  07/03/2023      PCP Appointment Date: 7/6/2023  Specialist Appointment Date: TBD  Hospital Encounter and Summary: Linked   This patient has a follow up scheduled with PCP within 2 business days of DC on 7/6/2023.      Mela was seen today for hospital follow-up.  Diagnoses and all orders for this visit:  Hospital discharge follow-up (Primary)  Other hyperlipidemia  -      atorvastatin (Lipitor) 20 mg tablet; Take 1 tablet (20 mg) by mouth once daily.  Type 2 diabetes mellitus with hyperglycemia, without long-term current use of insulin (CMS/[facility])  -     atorvastatin (Lipitor) 20 mg tablet; Take 1 tablet (20 mg) by mouth once daily. Start januvia.  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 A1c is elevated.  Edema of both lower legs  Mild protein-calorie malnutrition (CMS/HCC) she is on a supplement.   Parkinsons disease (CMS/HCC)  Other polyneuropathy  Anemia associated with nutritional deficiency--continue iron 3 times per week and will follow labs  Other orders  -     psyllium (Metamucil) 3.4 gram packet; One packet once per day in 8 oz of water.  -     SITagliptin phosphate (Januvia) 50 mg tablet; Take 1 tablet (50 mg) by mouth once daily.          She is improving. Seems to have good care at home.  Will see how she does. We discussed that she has had multiple admissions mainly for weakness and uti, that home nurse did not think she should have gone home. But pt indicates she will put forth effort to get stronger, son is there to help. [facility] on board.  We discussed the issue with the depends and she should change them everythime they are wet and needs to hydrate better. This will help keep her out of the hospital.  She states she will do this.  Parkinsons is managed by neuro and seems same on exam.  She is more cheerful today than last visit and seems more motivated.  Diabetes:A1c is 8.0-elevated. Will restart januvia.    Pt instructions:  Medications:  Try to minimize any use of aleve: do not take it every day.  Try tylenol first.    Stay hydrated. Drinking 48-64 oz water per day.    Change your depends every time they are wet. Change them more than once per day.  When you change them, take a  body wipe or wet washrag and wipe front to back before putting on the new depends.    Aquafor to sore area lower back, top of gluteal crease.  Home health nurse to check sore skin area gluteal crease.    Diabetes: the hemoglobin A1c is 8.0 which is elevated.  Take Januvia 50mg every am.  Change cranberry and tea drinks to sugar free for now.  Decrease corn bread at breakfast to a few times per week, try a different bread on the otherdays that is less sweet.    Continue iron three times per week.    -----  Prep 16 minutes.

## 2023-07-19 ENCOUNTER — PATIENT OUTREACH (OUTPATIENT)
Dept: PRIMARY CARE | Facility: CLINIC | Age: 83
End: 2023-07-19
Payer: MEDICARE

## 2023-07-19 NOTE — PROGRESS NOTES
Unable to reach patient for call back after patient's follow up appointment with PCP.   EFRAINM with call back number for patient to call if needed   If no voicemail available call attempts x 2 were made to contact the patient to assist with any questions or concerns patient may have.

## 2023-08-24 ENCOUNTER — TELEPHONE (OUTPATIENT)
Dept: PRIMARY CARE | Facility: CLINIC | Age: 83
End: 2023-08-24
Payer: MEDICARE

## 2023-08-24 ENCOUNTER — PATIENT OUTREACH (OUTPATIENT)
Dept: PRIMARY CARE | Facility: CLINIC | Age: 83
End: 2023-08-24
Payer: MEDICARE

## 2023-08-24 ENCOUNTER — DOCUMENTATION (OUTPATIENT)
Dept: PRIMARY CARE | Facility: CLINIC | Age: 83
End: 2023-08-24
Payer: MEDICARE

## 2023-08-24 RX ORDER — METFORMIN HYDROCHLORIDE 500 MG/1
500 TABLET ORAL
COMMUNITY
Start: 2023-07-18

## 2023-08-24 RX ORDER — POLYETHYLENE GLYCOL 3350 17 G/17G
17 POWDER, FOR SOLUTION ORAL DAILY
COMMUNITY

## 2023-08-24 RX ORDER — AMOXICILLIN 250 MG
1 CAPSULE ORAL DAILY
COMMUNITY

## 2023-08-24 NOTE — PROGRESS NOTES
Discharge Facility: [facility]   Discharge Diagnosis: Weakness; Parkinson's Disease  Admission Date: 8/14/2023  Discharge Date: 8/23/2023    PCP Appointment Date: TBD-office tasked to arrange follow up with PCP as needed  Specialist Appointment Date: TBD  Hospital Encounter and Summary: Linked  See discharge assessment below for further details  Engagement  Call Start Time: 1034 (8/24/2023 10:55 AM)    Medications  Medications reviewed with patient/caregiver?: Yes (meds discussed with patient) (8/24/2023 10:55 AM)  Is the patient having any side effects they believe may be caused by any medication additions or changes?: No (8/24/2023 10:55 AM)  Does the patient have all medications ordered at discharge?: Yes (8/24/2023 10:55 AM)  Prescription Comments: see med list (senna; miralax) (8/24/2023 10:55 AM)  Is the patient taking all medications as directed (includes completed medication regime)?: Yes (8/24/2023 10:55 AM)  Care Management Interventions: Provided patient education (8/24/2023 10:55 AM)    Appointments  Does the patient have a primary care provider?: Yes (8/24/2023 10:55 AM)  Care Management Interventions: Educated patient on importance of making appointment; Advised patient to make appointment (Office tasked to arrange follow up with PCP as needed) (8/24/2023 10:55 AM)  Has the patient kept scheduled appointments due by today?: Yes (8/24/2023 10:55 AM)  Care Management Interventions: Advised patient to keep appointment (8/24/2023 10:55 AM)    Self Management  What is the home health agency?: Nothing arranged at this time but patient wants [facility]-referral to be sent by Dr. Kumar if SNF did not put in referral before DC (8/24/2023 10:55 AM)  Has home health visited the patient within 72 hours of discharge?: No (8/24/2023 10:55 AM)  Home Health Interventions: Called home health agency (8/24/2023 10:55 AM)  What Durable Medical Equipment (DME) was ordered?: wheelchair (8/24/2023 10:55 AM)  Has  all Durable Medical Equipment (DME) been delivered?: Yes (8/24/2023 10:55 AM)    Patient Teaching  Does the patient have access to their discharge instructions?: Yes (8/24/2023 10:55 AM)  Care Management Interventions: Reviewed instructions with patient (8/24/2023 10:55 AM)  What is the patient's perception of their health status since discharge?: Improving (8/24/2023 10:55 AM)  Is the patient/caregiver able to teach back the hierarchy of who to call/visit for symptoms/problems? PCP, Specialist, Home Health nurse, Urgent Care, ED, 911: Yes (8/24/2023 10:55 AM)  Patient/Caregiver Education Comments: Patient states she had fallen out of her wheelchair at one point while in SNF trying to reach for something and now has some severe bruising and skin tears from this fall. She states she lives at home and her son lives with her and helps take care of her when he is home. She is alone during the day. Would like to resume [facility]. [facility] preferred. Called and they stated her case episode had been closed and new referral would be needed to start care again. Called SNF to ensure referral from them hadn't been sent somewhere else first. (8/24/2023 10:55 AM)

## 2023-08-24 NOTE — TELEPHONE ENCOUNTER
Kamla Peterson from [facility] called.   Pt ([facility] patient) discharged from hosp and needs home care authorization  Asap  Kamla Peterson  [phone]

## 2023-08-31 ENCOUNTER — APPOINTMENT (OUTPATIENT)
Dept: PRIMARY CARE | Facility: CLINIC | Age: 83
End: 2023-08-31
Payer: MEDICARE

## 2023-10-19 ENCOUNTER — TELEPHONE (OUTPATIENT)
Dept: PRIMARY CARE | Facility: CLINIC | Age: 83
End: 2023-10-19
Payer: MEDICARE

## 2023-10-19 NOTE — TELEPHONE ENCOUNTER
"New update; I called Tyson at [facility].  She received an email that the pt was transported from home via EMS to \"the hospital\" (presumably [facility] [place] [facility] as pt is [place] resident) for exacerbation of Parkinson sx.  No home care orders needed at this time.  Note forwarded to [facility] on call.  Please advise.  Tello      "

## 2023-10-19 NOTE — TELEPHONE ENCOUNTER
"FYI,  \"we started care for her.  She has a stage 2 on her Left buttocks, she has a stage 3 on her Left heel, I'm going to have a nurse see her once/week this week, 2x/week for the next 2 weeks, and once/week x 4 weeks.  [phone]; I'll need orders for her.\"  Message forwarded to [facility] on call for SAURAV.  marge  "

## 2023-11-13 ENCOUNTER — DOCUMENTATION (OUTPATIENT)
Dept: PRIMARY CARE | Facility: CLINIC | Age: 83
End: 2023-11-13
Payer: MEDICARE

## 2023-11-13 NOTE — PROGRESS NOTES
Note to document discharge summary from [facility], not previously received . Patient transferred to [facility].  Has a sacral wound that needs care, weak from parkinsons, somnolent from tid gabapentin. Notes reviewed.  Pt missed her follow up here in October as was still in the hospital. Home situation is not good -per recent ecf note received home is cluttered soiled, unsafe floor coverings/        Discharge summary [facility]:  - documented in this encounter  Rufina Causey MD - 10/24/2023 3:27 PM EDT  Formatting of this note is different from the original.  Images from the original note were not included.      DISCHARGE SUMMARY    PATIENT NAME: Mela Kumari ADMISSION DATE: 10/19/2023  MRN: [id number] DISCHARGE DATE: 10/24/2023    ATTENDING PHYSICIAN: Rufina Causey MD Code Status: DNR-CCA    Highest Readmission Risk Score: 30  The 30 day readmissions risk score is derived from an internally validated risk model which evaluates patient level characteristics, utilization history, medication orders and lab results up until the day of discharge. Patients with a score of 40 or above are considered highest risk for readmission. Specific patient level drivers will be listed at the bottom of the summary.      CONSULTING TEAMS DURING HOSPITALIZATION: Neurology:  Treatment Team:  Attending Provider: Rufina Causey MD  Nurse Practitioner: Yuli Moser APRN.CNP  REASON FOR HOSPITALIZATION: weakness    DIAGNOSIS:  Principal Problem:  Weakness (POA: Yes)  Active Problems:  Parkinson disease (POA: Yes)  HTN (hypertension) (POA: Yes)  Pressure injury of coccygeal region, stage 2 (HCC) (POA: Yes)  Pressure injury of deep tissue of heel (POA: Yes)  At high risk for impaired skin integrity (POA: Yes)  Resolved Problems:  * No resolved hospital problems. *    OPERATIONS DURING HOSPITALIZATION: None    PROCEDURES DURING HOSPITALIZATION: No procedures performed    HOSPITAL COURSE:  84 yo woman with hx of parkinson's disease, HTN HPL,  recent 6 week stay at [facility] who was brought home by family electively who presents because of progressive weakness and difficulty in family meeting ADL's  Labs/imaging/urinalysis don't suggest infection, neg UA and chest xray, neg COVID  Neurology assessed pt for falls. Likely is parkingsons disease progression plus decondition. She also also found to have wound on coccyx and L heel and wound care consulted. PT recommended SNF placement.  She was sleepy in morning on TID dosing gabapentin so was decreased to BID with improved alertness.    Coccyx Stage 2 PI  - POA  - Cleanse wound with soap and water. Rinse and pat dry. Apply Triad cream to wound daily and PRN.    Left Heel DTI  - POA  - Cleanse with soap and water. Rinse and pat dry. Apply silicone foam dressing. Change 3x/week and PRN.    - follow up requested with movement disorder clinic, neurology    Diet Recommendations: Dental Soft, Thin Liquids IDDSI Level 0, Medications whole in puree (pudding/applesauce)        Transitions of Care Critical Issues:      LABS AND PROCEDURES PENDING AT DISCHARGE: No pending results.        PATIENT CONDITION AT DISCHARGE: Stable    DISCHARGE DISPOSITION:    Skilled Nursing Facility    Discharge Physical Exam:  VITAL SIGNS: /62  Pulse 99  Temp 36.3 °C (97.3 °F) (Axillary)  Resp 18  Wt 58.8 kg (129 lb 10.1 oz)  SpO2 97%  BMI 25.32 kg/m²  GENERAL: Alert, no distress, cooperative  EYES: PERRLA  OROPHARYNX: Lips, mucosa, and tongue normal. Teeth and gums normal. Oropharynx normal.  LUNGS: Lungs clear to auscultation, Good diaphragmatic excursion  CARDIAC: Normal S1 and S2; no rubs, murmurs, or gallops  ABDOMEN: Abdomen soft, non-tender, BS normal, No masses or organomegaly  NEURO: cogwheel rigidity, resting tremor    INFORMATION PROVIDED TO PATIENT:    WOUND/SURGICAL SITE CARE:  Coccyx Stage 2 PI  - Cleanse wound with soap and water. Rinse and pat dry. Apply Triad cream to wound daily and PRN.    Left Heel  DTI  - Cleanse with soap and water. Rinse and pat dry. Apply silicone foam dressing. Change 3x/week and PRN.    DIET: Diet Recommendations: Dental Soft, Thin Liquids IDDSI Level 0, Medications whole in puree (pudding/applesauce)    ACTIVITY: Resume pre-hospital activity    ALLERGIES  No Known Allergies    DISCHARGE MEDICATION:    Medication List      CHANGE how you take these medications    gabapentin 300 mg capsule  Commonly known as: NEURONTIN  Take 1 capsule by mouth every 12 hours for 1 day.  What changed: when to take this          CONTINUE taking these medications    acetaminophen 325 mg tablet  Commonly known as: TYLENOL  Take 2 tablets by mouth every 6 hours as needed for pain.    carbidopa-levodopa  mg per tablet  Commonly known as: SINEMET     cyanocobalamin 500 mcg tablet  Commonly known as: VITAMIN B-12  Take 1 tablet by mouth once daily.    ENSURE HIGH PROTEIN Liqd  Generic drug: Food Supplement, Lactose-Free  Take 237 mL by mouth three times daily with meals.    ferrous gluconate 324 mg (37.5 mg iron) tablet    metFORMIN 500 mg tablet  Commonly known as: GLUCOPHAGE  Take 1 tablet by mouth daily with breakfast.    Mirtazapine 7.5 mg tablet  Commonly known as: Remeron    polyethylene glycol 3350 17 gram packet  Take 1 Packet by mouth once daily as needed for constipation. Dissolve dose in 4 - 8 ounces of liquid and take as directed.    senna-docusate 8.6-50 mg per tablet  Commonly known as: SENNA-S  Take 1 tablet by mouth twice daily as needed for constipation.          FUTURE APPOINTMENTS:  Follow Up with PCP: Maggie Kumar MD  Follow Up with neurology movement clinic- referal placed      The patient's risk for 30-day readmission is determined using the following contributing factors:  Pt variables contributing to increased readmission risk:    20 Most Recent BUN Result  14 Active Medication Orders  10 Number of Previous ED Visits (6 mos.)  9.2 First Resulted Calcium During Admission  4  Number of Hospitalizations (12 mos.)  1 Previous ED Visit (6 mos.)?  1 Insurance - Medicare  1 History of Anemia  1 Barriers to Health Literacy Identified      Plan of care discussed with Provider, RN, Patient    I have performed the face-to-face and relevant services for a total of < 30 minutes.    SIGNATURE: Rufina Causey MD  DATE: October 24, 2023  TIME: 3:27 PM    Electronically signed by Rufina Causey MD at 10/24/2023 3:37 PM EDT   Discharge Instructions  - documented in this encounter  Discharge Instr - Other Orders  Yuli Moser APRN.CNP - 10/20/2023 2:11 PM EDT   Formatting of this note might be different from the original.  WOUND CARE:  Sacral/Coccyx Wound:  Cleanse wound with soap and water. Rinse and pat dry. Apply Triad cream to wound daily and PRN.    Left Heel Wound:  Cleanse with soap and water. Rinse and pat dry. Apply silicone foam dressing. Change 3x/week and PRN.    Electronically signed by Yuli Moser APRN.CNP at 10/20/2023 2:11 PM EDT     Medications at Time of Discharge  - documented as of this encounter  Medications at Time of Discharge  Medication Sig Dispensed Refills Start Date End Date   acetaminophen (TYLENOL) 325 mg tablet  Take 2 tablets by mouth every 6 hours as needed for pain.   0 08/14/2023     polyethylene glycol 3350 17 gram packet  Take 1 Packet by mouth once daily as needed for constipation. Dissolve dose in 4 - 8 ounces of liquid and take as directed.   0 08/14/2023     senna-docusate (SENNA-S) 8.6-50 mg per tablet  Take 1 tablet by mouth twice daily as needed for constipation.   0 08/14/2023     Food Supplement, Lactose-Free (ENSURE HIGH PROTEIN) liqd  Take 237 mL by mouth three times daily with meals.   0 06/14/2023     Mirtazapine (REMERON) 7.5 mg tablet  Take 7.5 mg by mouth daily at bedtime.   0       cyanocobalamin (VITAMIN B-12) 500 mcg tablet  Take 1 tablet by mouth once daily.   0 11/17/2022     carbidopa-levodopa (SINEMET )  mg per tablet  Take 2  tablets by mouth four times daily. 7, noon, 5 pm, 10 pm   0       ferrous gluconate 324 mg (37.5 mg iron) tablet  Take 324 mg by mouth daily with breakfast. Patient only takes 3 times a week on Monday Wednesday and Fridays   0       gabapentin (NEURONTIN) 300 mg capsule  Take 1 capsule by mouth every 12 hours for 1 day.   0 10/24/2023       Ordered Prescriptions  - documented in this encounterReconcile with Patient's Chart  Ordered Prescriptions  Prescription Sig Dispensed Refills Start Date End Date   gabapentin (NEURONTIN) 300 mg capsule  Take 1 capsule by mouth every 12 hours for 1 day.   0 10/24/2023       Discharge Disposition  - documented in this encounter  Discharge Disposition  Disposition Code Departure Means Destination Comments   Skilled Nursing Facility

## 2023-11-20 ENCOUNTER — TELEPHONE (OUTPATIENT)
Dept: PRIMARY CARE | Facility: CLINIC | Age: 83
End: 2023-11-20
Payer: MEDICARE

## 2023-11-20 NOTE — TELEPHONE ENCOUNTER
Left message with son and patient to check patient progress.  Left messages. Then called [facility] and Left a message there as well.

## 2023-11-21 NOTE — TELEPHONE ENCOUNTER
I called the C RN and spoke with her regarding pt care and what is happening with her. She was discharged from the SNF last week and has been on HHC since. JENNIFER Alvarado will speak to RN that is managing pt and have her discuss options. She has had 4 no shows here in less than 6 months and they are concerned that she may not be able to get here. They are going to discuss plan with pt about if she should have a PCP come to the house and see her, or facilitate her transportation so that she can come here. She will discuss with nurse and have her speak to the pt and see what she would like to do. I also discussed that I have hard time reaching out to the emergency contact as well, and she reports that she will ask the pt since she is now at home. She will let us know.

## 2023-11-21 NOTE — TELEPHONE ENCOUNTER
Fatou from [facility] called    1) patient fell this morning when bending down and tore skin on left forearm   2) patient fell yesterday while bending down  3) patient denies hitting head  4) patient was unaware of missed appointments. Communication should be with jarod Moss ([phone])     Fatou Crane [phone]

## 2023-11-21 NOTE — TELEPHONE ENCOUNTER
I called son, Isa and left a detailed message that she needs to be seen ASAP. I asked him to bring her in at 10:30 am per Dr Kumar's request. I advised him to call the office at 9am and tell us if he could make it up here with her at 10:30 tomorrow.

## 2023-11-22 ENCOUNTER — APPOINTMENT (OUTPATIENT)
Dept: PRIMARY CARE | Facility: CLINIC | Age: 83
End: 2023-11-22
Payer: MEDICARE

## 2023-11-22 NOTE — TELEPHONE ENCOUNTER
I called the number listed as work, (patient does not work, so thought this might be her land line): it was [employer].    I called then the home number [phone] which is also listed as son Pavan's number, and received a generic voice mail: I LMTC the office and said what our hours were.    Office called Isa her other son this am at the [phone] number.   I last saw her July 6 after multiple no shows and cancels  She has cancelled or no showed for multiple appts since then -sons not bringing her to the appts. One of those no shows she was in the hospital.  We have exhausted all possibilities to have a face to face meeting with this patient.   I am concerned about the accuracy of the home health forms as I cannot make my own assessment. I have no way of assessing if the med list is accurate or how her examination is.    I will not be signing the home health care forms.  We did speak to [facility] yesterday and they mentioned asking patient about getting a doctor that would come to the house. I do not know anyone but that would be her best option.

## 2024-01-22 ENCOUNTER — TELEPHONE (OUTPATIENT)
Dept: PRIMARY CARE | Facility: CLINIC | Age: 84
End: 2024-01-22

## 2024-01-22 NOTE — TELEPHONE ENCOUNTER
Patient no-showed again today. Dr. Kumar would like you to call [facility] [phone], and see if she is seeing the doctor there and get the drs name. Also Dr. Kumar would like to know if she is going to be in their care for awhile. Dr. Kumar would just like to see what is going on and get an update on patient since she has been no-showing all her appts with Dr. Kumar.

## 2024-01-24 NOTE — TELEPHONE ENCOUNTER
Spoke with MYA at [facility], patient is converting to long term stay at the facility and is under the care of the facility's primary care physician.

## 2024-04-12 ENCOUNTER — HOSPITAL ENCOUNTER (INPATIENT)
Facility: HOSPITAL | Age: 84
LOS: 1 days | Discharge: PSYCHIATRIC OR SUBSTANCE USE FACILITY | DRG: 056 | End: 2024-04-17
Attending: STUDENT IN AN ORGANIZED HEALTH CARE EDUCATION/TRAINING PROGRAM | Admitting: STUDENT IN AN ORGANIZED HEALTH CARE EDUCATION/TRAINING PROGRAM
Payer: MEDICARE

## 2024-04-12 ENCOUNTER — APPOINTMENT (OUTPATIENT)
Dept: RADIOLOGY | Facility: HOSPITAL | Age: 84
DRG: 056 | End: 2024-04-12
Payer: MEDICARE

## 2024-04-12 DIAGNOSIS — N30.00 ACUTE CYSTITIS WITHOUT HEMATURIA: ICD-10-CM

## 2024-04-12 DIAGNOSIS — F03.918 AGGRESSIVE BEHAVIOR DUE TO DEMENTIA (MULTI): Primary | ICD-10-CM

## 2024-04-12 PROCEDURE — 99285 EMERGENCY DEPT VISIT HI MDM: CPT

## 2024-04-12 RX ORDER — SODIUM CHLORIDE 9 MG/ML
75 INJECTION, SOLUTION INTRAVENOUS CONTINUOUS
Status: DISCONTINUED | OUTPATIENT
Start: 2024-04-12 | End: 2024-04-16

## 2024-04-12 ASSESSMENT — LIFESTYLE VARIABLES
TOTAL SCORE: 0
HAVE YOU EVER FELT YOU SHOULD CUT DOWN ON YOUR DRINKING: NO
EVER HAD A DRINK FIRST THING IN THE MORNING TO STEADY YOUR NERVES TO GET RID OF A HANGOVER: NO
HAVE PEOPLE ANNOYED YOU BY CRITICIZING YOUR DRINKING: NO
EVER FELT BAD OR GUILTY ABOUT YOUR DRINKING: NO

## 2024-04-12 ASSESSMENT — PAIN - FUNCTIONAL ASSESSMENT: PAIN_FUNCTIONAL_ASSESSMENT: 0-10

## 2024-04-12 ASSESSMENT — PAIN SCALES - GENERAL: PAINLEVEL_OUTOF10: 0 - NO PAIN

## 2024-04-12 ASSESSMENT — COLUMBIA-SUICIDE SEVERITY RATING SCALE - C-SSRS
6. HAVE YOU EVER DONE ANYTHING, STARTED TO DO ANYTHING, OR PREPARED TO DO ANYTHING TO END YOUR LIFE?: NO
1. IN THE PAST MONTH, HAVE YOU WISHED YOU WERE DEAD OR WISHED YOU COULD GO TO SLEEP AND NOT WAKE UP?: NO
2. HAVE YOU ACTUALLY HAD ANY THOUGHTS OF KILLING YOURSELF?: NO

## 2024-04-13 ENCOUNTER — APPOINTMENT (OUTPATIENT)
Dept: RADIOLOGY | Facility: HOSPITAL | Age: 84
DRG: 056 | End: 2024-04-13
Payer: MEDICARE

## 2024-04-13 PROBLEM — F02.80: Status: ACTIVE | Noted: 2024-04-13

## 2024-04-13 PROBLEM — G20.A1: Status: ACTIVE | Noted: 2024-04-13

## 2024-04-13 LAB
ALBUMIN SERPL BCP-MCNC: 3.7 G/DL (ref 3.4–5)
ALP SERPL-CCNC: 86 U/L (ref 33–136)
ALT SERPL W P-5'-P-CCNC: <3 U/L (ref 7–45)
ANION GAP SERPL CALC-SCNC: 9 MMOL/L (ref 10–20)
APPEARANCE UR: CLEAR
AST SERPL W P-5'-P-CCNC: 14 U/L (ref 9–39)
BASOPHILS # BLD AUTO: 0.02 X10*3/UL (ref 0–0.1)
BASOPHILS NFR BLD AUTO: 0.4 %
BILIRUB SERPL-MCNC: 0.3 MG/DL (ref 0–1.2)
BILIRUB UR STRIP.AUTO-MCNC: NEGATIVE MG/DL
BUN SERPL-MCNC: 26 MG/DL (ref 6–23)
CALCIUM SERPL-MCNC: 8.7 MG/DL (ref 8.6–10.3)
CARDIAC TROPONIN I PNL SERPL HS: 6 NG/L (ref 0–13)
CARDIAC TROPONIN I PNL SERPL HS: 7 NG/L (ref 0–13)
CHLORIDE SERPL-SCNC: 103 MMOL/L (ref 98–107)
CO2 SERPL-SCNC: 30 MMOL/L (ref 21–32)
COLOR UR: ABNORMAL
CREAT SERPL-MCNC: 0.7 MG/DL (ref 0.5–1.05)
EGFRCR SERPLBLD CKD-EPI 2021: 87 ML/MIN/1.73M*2
EOSINOPHIL # BLD AUTO: 0.14 X10*3/UL (ref 0–0.4)
EOSINOPHIL NFR BLD AUTO: 2.9 %
ERYTHROCYTE [DISTWIDTH] IN BLOOD BY AUTOMATED COUNT: 13.1 % (ref 11.5–14.5)
FLUAV RNA RESP QL NAA+PROBE: NOT DETECTED
FLUBV RNA RESP QL NAA+PROBE: NOT DETECTED
GLUCOSE BLD MANUAL STRIP-MCNC: 117 MG/DL (ref 74–99)
GLUCOSE BLD MANUAL STRIP-MCNC: 123 MG/DL (ref 74–99)
GLUCOSE SERPL-MCNC: 190 MG/DL (ref 74–99)
GLUCOSE UR STRIP.AUTO-MCNC: ABNORMAL MG/DL
HCT VFR BLD AUTO: 33.9 % (ref 36–46)
HGB BLD-MCNC: 11.5 G/DL (ref 12–16)
IMM GRANULOCYTES # BLD AUTO: 0.01 X10*3/UL (ref 0–0.5)
IMM GRANULOCYTES NFR BLD AUTO: 0.2 % (ref 0–0.9)
INR PPP: 1.1 (ref 0.9–1.1)
KETONES UR STRIP.AUTO-MCNC: ABNORMAL MG/DL
LEUKOCYTE ESTERASE UR QL STRIP.AUTO: NEGATIVE
LYMPHOCYTES # BLD AUTO: 1.47 X10*3/UL (ref 0.8–3)
LYMPHOCYTES NFR BLD AUTO: 30.2 %
MAGNESIUM SERPL-MCNC: 1.5 MG/DL (ref 1.6–2.4)
MCH RBC QN AUTO: 32.2 PG (ref 26–34)
MCHC RBC AUTO-ENTMCNC: 33.9 G/DL (ref 32–36)
MCV RBC AUTO: 95 FL (ref 80–100)
MONOCYTES # BLD AUTO: 0.4 X10*3/UL (ref 0.05–0.8)
MONOCYTES NFR BLD AUTO: 8.2 %
NEUTROPHILS # BLD AUTO: 2.83 X10*3/UL (ref 1.6–5.5)
NEUTROPHILS NFR BLD AUTO: 58.1 %
NITRITE UR QL STRIP.AUTO: NEGATIVE
NRBC BLD-RTO: 0 /100 WBCS (ref 0–0)
PH UR STRIP.AUTO: 5 [PH]
PLATELET # BLD AUTO: 242 X10*3/UL (ref 150–450)
POTASSIUM SERPL-SCNC: 4.3 MMOL/L (ref 3.5–5.3)
PROT SERPL-MCNC: 6.4 G/DL (ref 6.4–8.2)
PROT UR STRIP.AUTO-MCNC: NEGATIVE MG/DL
PROTHROMBIN TIME: 11.9 SECONDS (ref 9.8–12.8)
RBC # BLD AUTO: 3.57 X10*6/UL (ref 4–5.2)
RBC # UR STRIP.AUTO: NEGATIVE /UL
SARS-COV-2 RNA RESP QL NAA+PROBE: NOT DETECTED
SODIUM SERPL-SCNC: 138 MMOL/L (ref 136–145)
SP GR UR STRIP.AUTO: 1.03
UROBILINOGEN UR STRIP.AUTO-MCNC: <2 MG/DL
WBC # BLD AUTO: 4.9 X10*3/UL (ref 4.4–11.3)

## 2024-04-13 PROCEDURE — 71045 X-RAY EXAM CHEST 1 VIEW: CPT

## 2024-04-13 PROCEDURE — 84484 ASSAY OF TROPONIN QUANT: CPT | Performed by: PHYSICIAN ASSISTANT

## 2024-04-13 PROCEDURE — 80053 COMPREHEN METABOLIC PANEL: CPT | Performed by: PHYSICIAN ASSISTANT

## 2024-04-13 PROCEDURE — G0378 HOSPITAL OBSERVATION PER HR: HCPCS

## 2024-04-13 PROCEDURE — 82947 ASSAY GLUCOSE BLOOD QUANT: CPT

## 2024-04-13 PROCEDURE — 87636 SARSCOV2 & INF A&B AMP PRB: CPT | Performed by: PHYSICIAN ASSISTANT

## 2024-04-13 PROCEDURE — 99221 1ST HOSP IP/OBS SF/LOW 40: CPT | Performed by: PSYCHIATRY & NEUROLOGY

## 2024-04-13 PROCEDURE — 96372 THER/PROPH/DIAG INJ SC/IM: CPT

## 2024-04-13 PROCEDURE — 71045 X-RAY EXAM CHEST 1 VIEW: CPT | Performed by: RADIOLOGY

## 2024-04-13 PROCEDURE — 99222 1ST HOSP IP/OBS MODERATE 55: CPT

## 2024-04-13 PROCEDURE — 36415 COLL VENOUS BLD VENIPUNCTURE: CPT | Performed by: PHYSICIAN ASSISTANT

## 2024-04-13 PROCEDURE — 85025 COMPLETE CBC W/AUTO DIFF WBC: CPT | Performed by: PHYSICIAN ASSISTANT

## 2024-04-13 PROCEDURE — 81003 URINALYSIS AUTO W/O SCOPE: CPT | Performed by: PHYSICIAN ASSISTANT

## 2024-04-13 PROCEDURE — 2500000004 HC RX 250 GENERAL PHARMACY W/ HCPCS (ALT 636 FOR OP/ED)

## 2024-04-13 PROCEDURE — 2500000004 HC RX 250 GENERAL PHARMACY W/ HCPCS (ALT 636 FOR OP/ED): Performed by: PHYSICIAN ASSISTANT

## 2024-04-13 PROCEDURE — 85610 PROTHROMBIN TIME: CPT | Performed by: PHYSICIAN ASSISTANT

## 2024-04-13 PROCEDURE — 83735 ASSAY OF MAGNESIUM: CPT | Performed by: PHYSICIAN ASSISTANT

## 2024-04-13 RX ORDER — ACETAMINOPHEN 325 MG/1
650 TABLET ORAL EVERY 4 HOURS PRN
Status: DISCONTINUED | OUTPATIENT
Start: 2024-04-13 | End: 2024-04-17 | Stop reason: HOSPADM

## 2024-04-13 RX ORDER — AMOXICILLIN 250 MG
1 CAPSULE ORAL DAILY
Status: DISCONTINUED | OUTPATIENT
Start: 2024-04-13 | End: 2024-04-17 | Stop reason: HOSPADM

## 2024-04-13 RX ORDER — ACETAMINOPHEN 500 MG
500 TABLET ORAL 3 TIMES DAILY
COMMUNITY
End: 2024-04-17 | Stop reason: HOSPADM

## 2024-04-13 RX ORDER — INSULIN LISPRO 100 [IU]/ML
1 INJECTION, SOLUTION INTRAVENOUS; SUBCUTANEOUS
COMMUNITY

## 2024-04-13 RX ORDER — FERROUS GLUCONATE 325 MG
38 TABLET ORAL
Status: DISCONTINUED | OUTPATIENT
Start: 2024-04-14 | End: 2024-04-17 | Stop reason: HOSPADM

## 2024-04-13 RX ORDER — POLYETHYLENE GLYCOL 3350 17 G/17G
17 POWDER, FOR SOLUTION ORAL DAILY
Status: DISCONTINUED | OUTPATIENT
Start: 2024-04-13 | End: 2024-04-17 | Stop reason: HOSPADM

## 2024-04-13 RX ORDER — MIRTAZAPINE 15 MG/1
7.5 TABLET, FILM COATED ORAL NIGHTLY
Status: DISCONTINUED | OUTPATIENT
Start: 2024-04-13 | End: 2024-04-17 | Stop reason: HOSPADM

## 2024-04-13 RX ORDER — ATORVASTATIN CALCIUM 20 MG/1
20 TABLET, FILM COATED ORAL DAILY
Status: DISCONTINUED | OUTPATIENT
Start: 2024-04-13 | End: 2024-04-17 | Stop reason: HOSPADM

## 2024-04-13 RX ORDER — TRAMADOL HYDROCHLORIDE 50 MG/1
50 TABLET ORAL EVERY 12 HOURS PRN
Status: DISCONTINUED | OUTPATIENT
Start: 2024-04-13 | End: 2024-04-17 | Stop reason: HOSPADM

## 2024-04-13 RX ORDER — TALC
3 POWDER (GRAM) TOPICAL NIGHTLY PRN
Status: DISCONTINUED | OUTPATIENT
Start: 2024-04-13 | End: 2024-04-17 | Stop reason: HOSPADM

## 2024-04-13 RX ORDER — ACETAMINOPHEN 160 MG/5ML
650 SOLUTION ORAL EVERY 4 HOURS PRN
Status: DISCONTINUED | OUTPATIENT
Start: 2024-04-13 | End: 2024-04-17 | Stop reason: HOSPADM

## 2024-04-13 RX ORDER — DULOXETIN HYDROCHLORIDE 20 MG/1
20 CAPSULE, DELAYED RELEASE ORAL DAILY
Status: DISCONTINUED | OUTPATIENT
Start: 2024-04-13 | End: 2024-04-17 | Stop reason: HOSPADM

## 2024-04-13 RX ORDER — ACETAMINOPHEN 650 MG/1
650 SUPPOSITORY RECTAL EVERY 4 HOURS PRN
Status: DISCONTINUED | OUTPATIENT
Start: 2024-04-13 | End: 2024-04-17 | Stop reason: HOSPADM

## 2024-04-13 RX ORDER — PANTOPRAZOLE SODIUM 40 MG/10ML
40 INJECTION, POWDER, LYOPHILIZED, FOR SOLUTION INTRAVENOUS
Status: DISCONTINUED | OUTPATIENT
Start: 2024-04-14 | End: 2024-04-17 | Stop reason: HOSPADM

## 2024-04-13 RX ORDER — DEXTROSE 50 % IN WATER (D50W) INTRAVENOUS SYRINGE
12.5
Status: DISCONTINUED | OUTPATIENT
Start: 2024-04-13 | End: 2024-04-17 | Stop reason: HOSPADM

## 2024-04-13 RX ORDER — HALOPERIDOL 5 MG/ML
2 INJECTION INTRAMUSCULAR EVERY 6 HOURS PRN
Status: DISCONTINUED | OUTPATIENT
Start: 2024-04-13 | End: 2024-04-17 | Stop reason: HOSPADM

## 2024-04-13 RX ORDER — DEXTROSE 50 % IN WATER (D50W) INTRAVENOUS SYRINGE
25
Status: DISCONTINUED | OUTPATIENT
Start: 2024-04-13 | End: 2024-04-17 | Stop reason: HOSPADM

## 2024-04-13 RX ORDER — HYDRALAZINE HYDROCHLORIDE 20 MG/ML
10 INJECTION INTRAMUSCULAR; INTRAVENOUS EVERY 6 HOURS PRN
Status: DISCONTINUED | OUTPATIENT
Start: 2024-04-13 | End: 2024-04-17 | Stop reason: HOSPADM

## 2024-04-13 RX ORDER — ONDANSETRON HYDROCHLORIDE 2 MG/ML
4 INJECTION, SOLUTION INTRAVENOUS EVERY 8 HOURS PRN
Status: DISCONTINUED | OUTPATIENT
Start: 2024-04-13 | End: 2024-04-17 | Stop reason: HOSPADM

## 2024-04-13 RX ORDER — ENTACAPONE 200 MG/1
200 TABLET ORAL 4 TIMES DAILY
COMMUNITY

## 2024-04-13 RX ORDER — ONDANSETRON 4 MG/1
4 TABLET, ORALLY DISINTEGRATING ORAL EVERY 8 HOURS PRN
Status: DISCONTINUED | OUTPATIENT
Start: 2024-04-13 | End: 2024-04-17 | Stop reason: HOSPADM

## 2024-04-13 RX ORDER — ENTACAPONE 200 MG/1
200 TABLET ORAL 4 TIMES DAILY
Status: DISCONTINUED | OUTPATIENT
Start: 2024-04-13 | End: 2024-04-17 | Stop reason: HOSPADM

## 2024-04-13 RX ORDER — CARBIDOPA AND LEVODOPA 25; 100 MG/1; MG/1
2 TABLET ORAL 4 TIMES DAILY
Status: DISCONTINUED | OUTPATIENT
Start: 2024-04-13 | End: 2024-04-17 | Stop reason: HOSPADM

## 2024-04-13 RX ORDER — PANTOPRAZOLE SODIUM 40 MG/1
40 TABLET, DELAYED RELEASE ORAL
Status: DISCONTINUED | OUTPATIENT
Start: 2024-04-14 | End: 2024-04-17 | Stop reason: HOSPADM

## 2024-04-13 RX ORDER — INSULIN LISPRO 100 [IU]/ML
0-5 INJECTION, SOLUTION INTRAVENOUS; SUBCUTANEOUS
Status: DISCONTINUED | OUTPATIENT
Start: 2024-04-13 | End: 2024-04-17 | Stop reason: HOSPADM

## 2024-04-13 RX ORDER — ENOXAPARIN SODIUM 100 MG/ML
40 INJECTION SUBCUTANEOUS EVERY 24 HOURS
Status: DISCONTINUED | OUTPATIENT
Start: 2024-04-13 | End: 2024-04-17 | Stop reason: HOSPADM

## 2024-04-13 RX ORDER — DULOXETIN HYDROCHLORIDE 20 MG/1
20 CAPSULE, DELAYED RELEASE ORAL DAILY
COMMUNITY

## 2024-04-13 RX ORDER — GABAPENTIN 300 MG/1
300 CAPSULE ORAL DAILY
Status: DISCONTINUED | OUTPATIENT
Start: 2024-04-13 | End: 2024-04-17 | Stop reason: HOSPADM

## 2024-04-13 RX ADMIN — SODIUM CHLORIDE 125 ML/HR: 9 INJECTION, SOLUTION INTRAVENOUS at 01:44

## 2024-04-13 RX ADMIN — SODIUM CHLORIDE 125 ML/HR: 9 INJECTION, SOLUTION INTRAVENOUS at 09:49

## 2024-04-13 RX ADMIN — SODIUM CHLORIDE 125 ML/HR: 9 INJECTION, SOLUTION INTRAVENOUS at 19:36

## 2024-04-13 RX ADMIN — ENOXAPARIN SODIUM 40 MG: 40 INJECTION SUBCUTANEOUS at 13:15

## 2024-04-13 SDOH — SOCIAL STABILITY: SOCIAL NETWORK: EMOTIONAL SUPPORT GIVEN: REASSURE

## 2024-04-13 SDOH — HEALTH STABILITY: MENTAL HEALTH: NEEDS EXPRESSED: DENIES

## 2024-04-13 SDOH — HEALTH STABILITY: MENTAL HEALTH: HAVE YOU ACTUALLY HAD ANY THOUGHTS OF KILLING YOURSELF?: NO

## 2024-04-13 SDOH — SOCIAL STABILITY: SOCIAL NETWORK: PARENT/GUARDIAN/SIGNIFICANT OTHER INVOLVEMENT: NO INVOLVEMENT

## 2024-04-13 SDOH — HEALTH STABILITY: MENTAL HEALTH: SUICIDE ASSESSMENT: ADULT (C-SSRS)

## 2024-04-13 SDOH — SOCIAL STABILITY: SOCIAL INSECURITY: FAMILY BEHAVIORS: UNABLE TO ASSESS

## 2024-04-13 SDOH — HEALTH STABILITY: MENTAL HEALTH: HAVE YOU EVER DONE ANYTHING, STARTED TO DO ANYTHING, OR PREPARED TO DO ANYTHING TO END YOUR LIFE?: NO

## 2024-04-13 SDOH — HEALTH STABILITY: MENTAL HEALTH: HAVE YOU WISHED YOU WERE DEAD OR WISHED YOU COULD GO TO SLEEP AND NOT WAKE UP?: NO

## 2024-04-13 SDOH — SOCIAL STABILITY: SOCIAL NETWORK: VISITOR BEHAVIORS: UNABLE TO ASSESS

## 2024-04-13 SDOH — HEALTH STABILITY: MENTAL HEALTH: NON-SPECIFIC ACTIVE SUICIDAL THOUGHTS (PAST 1 MONTH): NO

## 2024-04-13 SDOH — HEALTH STABILITY: MENTAL HEALTH

## 2024-04-13 SDOH — HEALTH STABILITY: MENTAL HEALTH: BEHAVIORS/MOOD: SLEEPING

## 2024-04-13 SDOH — HEALTH STABILITY: MENTAL HEALTH: SLEEP PATTERN: NAPS DURING THE DAY

## 2024-04-13 SDOH — HEALTH STABILITY: MENTAL HEALTH: ANXIETY SYMPTOMS: NO PROBLEMS REPORTED OR OBSERVED.

## 2024-04-13 SDOH — HEALTH STABILITY: MENTAL HEALTH: CONTENT: UNABLE TO ASSESS

## 2024-04-13 SDOH — HEALTH STABILITY: MENTAL HEALTH: DELUSIONS: OTHER (COMMENT)

## 2024-04-13 SDOH — HEALTH STABILITY: MENTAL HEALTH: WISH TO BE DEAD (PAST 1 MONTH): NO

## 2024-04-13 SDOH — ECONOMIC STABILITY: HOUSING INSECURITY: FEELS SAFE LIVING IN HOME: YES

## 2024-04-13 SDOH — HEALTH STABILITY: MENTAL HEALTH: DEPRESSION SYMPTOMS: CRYING;FEELINGS OF HELPLESSNESS

## 2024-04-13 SDOH — HEALTH STABILITY: MENTAL HEALTH: BEHAVIORS/MOOD: CALM;COOPERATIVE

## 2024-04-13 SDOH — HEALTH STABILITY: MENTAL HEALTH: SUICIDAL BEHAVIOR (LIFETIME): NO

## 2024-04-13 ASSESSMENT — PAIN SCALES - GENERAL: PAINLEVEL_OUTOF10: 0 - NO PAIN

## 2024-04-13 ASSESSMENT — COGNITIVE AND FUNCTIONAL STATUS - GENERAL
DRESSING REGULAR LOWER BODY CLOTHING: A LOT
MOVING TO AND FROM BED TO CHAIR: A LOT
TOILETING: A LOT
HELP NEEDED FOR BATHING: A LOT
TURNING FROM BACK TO SIDE WHILE IN FLAT BAD: A LOT
STANDING UP FROM CHAIR USING ARMS: A LOT
CLIMB 3 TO 5 STEPS WITH RAILING: A LOT
WALKING IN HOSPITAL ROOM: A LOT
MOVING FROM LYING ON BACK TO SITTING ON SIDE OF FLAT BED WITH BEDRAILS: A LOT
PERSONAL GROOMING: A LOT
EATING MEALS: A LOT
MOBILITY SCORE: 12
DRESSING REGULAR UPPER BODY CLOTHING: A LOT
DAILY ACTIVITIY SCORE: 12

## 2024-04-13 ASSESSMENT — PAIN - FUNCTIONAL ASSESSMENT: PAIN_FUNCTIONAL_ASSESSMENT: VAS (VISUAL ANALOG SCALE)

## 2024-04-13 ASSESSMENT — LIFESTYLE VARIABLES
SUBSTANCE_ABUSE_PAST_12_MONTHS: NO
PRESCIPTION_ABUSE_PAST_12_MONTHS: NO
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
## 2024-04-14 LAB
ANION GAP SERPL CALC-SCNC: 8 MMOL/L (ref 10–20)
BUN SERPL-MCNC: 14 MG/DL (ref 6–23)
CALCIUM SERPL-MCNC: 8.2 MG/DL (ref 8.6–10.3)
CHLORIDE SERPL-SCNC: 110 MMOL/L (ref 98–107)
CO2 SERPL-SCNC: 28 MMOL/L (ref 21–32)
CREAT SERPL-MCNC: 0.62 MG/DL (ref 0.5–1.05)
EGFRCR SERPLBLD CKD-EPI 2021: 90 ML/MIN/1.73M*2
ERYTHROCYTE [DISTWIDTH] IN BLOOD BY AUTOMATED COUNT: 13.3 % (ref 11.5–14.5)
GLUCOSE BLD MANUAL STRIP-MCNC: 122 MG/DL (ref 74–99)
GLUCOSE BLD MANUAL STRIP-MCNC: 158 MG/DL (ref 74–99)
GLUCOSE BLD MANUAL STRIP-MCNC: 193 MG/DL (ref 74–99)
GLUCOSE BLD MANUAL STRIP-MCNC: 199 MG/DL (ref 74–99)
GLUCOSE SERPL-MCNC: 111 MG/DL (ref 74–99)
HCT VFR BLD AUTO: 33 % (ref 36–46)
HGB BLD-MCNC: 11.2 G/DL (ref 12–16)
HOLD SPECIMEN: NORMAL
MCH RBC QN AUTO: 32.6 PG (ref 26–34)
MCHC RBC AUTO-ENTMCNC: 33.9 G/DL (ref 32–36)
MCV RBC AUTO: 96 FL (ref 80–100)
NRBC BLD-RTO: 0 /100 WBCS (ref 0–0)
PLATELET # BLD AUTO: 260 X10*3/UL (ref 150–450)
POTASSIUM SERPL-SCNC: 4 MMOL/L (ref 3.5–5.3)
RBC # BLD AUTO: 3.44 X10*6/UL (ref 4–5.2)
SODIUM SERPL-SCNC: 142 MMOL/L (ref 136–145)
WBC # BLD AUTO: 7.4 X10*3/UL (ref 4.4–11.3)

## 2024-04-14 PROCEDURE — 85027 COMPLETE CBC AUTOMATED: CPT

## 2024-04-14 PROCEDURE — 82947 ASSAY GLUCOSE BLOOD QUANT: CPT

## 2024-04-14 PROCEDURE — 99232 SBSQ HOSP IP/OBS MODERATE 35: CPT | Performed by: HOSPITALIST

## 2024-04-14 PROCEDURE — 36415 COLL VENOUS BLD VENIPUNCTURE: CPT

## 2024-04-14 PROCEDURE — 2500000001 HC RX 250 WO HCPCS SELF ADMINISTERED DRUGS (ALT 637 FOR MEDICARE OP)

## 2024-04-14 PROCEDURE — 2500000004 HC RX 250 GENERAL PHARMACY W/ HCPCS (ALT 636 FOR OP/ED)

## 2024-04-14 PROCEDURE — C9113 INJ PANTOPRAZOLE SODIUM, VIA: HCPCS

## 2024-04-14 PROCEDURE — 80048 BASIC METABOLIC PNL TOTAL CA: CPT

## 2024-04-14 PROCEDURE — 2500000004 HC RX 250 GENERAL PHARMACY W/ HCPCS (ALT 636 FOR OP/ED): Performed by: PHYSICIAN ASSISTANT

## 2024-04-14 PROCEDURE — 97165 OT EVAL LOW COMPLEX 30 MIN: CPT | Mod: GO | Performed by: OCCUPATIONAL THERAPIST

## 2024-04-14 PROCEDURE — 99232 SBSQ HOSP IP/OBS MODERATE 35: CPT | Performed by: PSYCHIATRY & NEUROLOGY

## 2024-04-14 PROCEDURE — 96372 THER/PROPH/DIAG INJ SC/IM: CPT

## 2024-04-14 PROCEDURE — 2500000001 HC RX 250 WO HCPCS SELF ADMINISTERED DRUGS (ALT 637 FOR MEDICARE OP): Performed by: HOSPITALIST

## 2024-04-14 PROCEDURE — 2500000002 HC RX 250 W HCPCS SELF ADMINISTERED DRUGS (ALT 637 FOR MEDICARE OP, ALT 636 FOR OP/ED): Mod: MUE | Performed by: HOSPITALIST

## 2024-04-14 PROCEDURE — 97162 PT EVAL MOD COMPLEX 30 MIN: CPT | Mod: GP

## 2024-04-14 PROCEDURE — 2500000004 HC RX 250 GENERAL PHARMACY W/ HCPCS (ALT 636 FOR OP/ED): Performed by: HOSPITALIST

## 2024-04-14 PROCEDURE — 92610 EVALUATE SWALLOWING FUNCTION: CPT | Mod: GN | Performed by: SPEECH-LANGUAGE PATHOLOGIST

## 2024-04-14 PROCEDURE — G0378 HOSPITAL OBSERVATION PER HR: HCPCS

## 2024-04-14 RX ADMIN — CARBIDOPA AND LEVODOPA 2 TABLET: 25; 100 TABLET ORAL at 19:59

## 2024-04-14 RX ADMIN — PANTOPRAZOLE SODIUM 40 MG: 40 INJECTION, POWDER, FOR SOLUTION INTRAVENOUS at 06:46

## 2024-04-14 RX ADMIN — POLYETHYLENE GLYCOL 3350 17 G: 17 POWDER, FOR SOLUTION ORAL at 09:34

## 2024-04-14 RX ADMIN — MIRTAZAPINE 7.5 MG: 15 TABLET, FILM COATED ORAL at 20:00

## 2024-04-14 RX ADMIN — CARBIDOPA AND LEVODOPA 2 TABLET: 25; 100 TABLET ORAL at 17:57

## 2024-04-14 RX ADMIN — GABAPENTIN 300 MG: 300 CAPSULE ORAL at 09:53

## 2024-04-14 RX ADMIN — ENTACAPONE 200 MG: 200 TABLET ORAL at 15:16

## 2024-04-14 RX ADMIN — CARBIDOPA AND LEVODOPA 2 TABLET: 25; 100 TABLET ORAL at 08:40

## 2024-04-14 RX ADMIN — ENTACAPONE 200 MG: 200 TABLET ORAL at 18:03

## 2024-04-14 RX ADMIN — INSULIN LISPRO 1 UNITS: 100 INJECTION, SOLUTION INTRAVENOUS; SUBCUTANEOUS at 16:43

## 2024-04-14 RX ADMIN — ATORVASTATIN CALCIUM 20 MG: 20 TABLET, FILM COATED ORAL at 09:48

## 2024-04-14 RX ADMIN — ENTACAPONE 200 MG: 200 TABLET ORAL at 08:39

## 2024-04-14 RX ADMIN — CARBIDOPA AND LEVODOPA 2 TABLET: 25; 100 TABLET ORAL at 15:14

## 2024-04-14 RX ADMIN — ENOXAPARIN SODIUM 40 MG: 40 INJECTION SUBCUTANEOUS at 13:35

## 2024-04-14 RX ADMIN — SODIUM CHLORIDE 125 ML/HR: 9 INJECTION, SOLUTION INTRAVENOUS at 03:51

## 2024-04-14 RX ADMIN — INSULIN LISPRO 1 UNITS: 100 INJECTION, SOLUTION INTRAVENOUS; SUBCUTANEOUS at 11:39

## 2024-04-14 RX ADMIN — ENTACAPONE 200 MG: 200 TABLET ORAL at 19:57

## 2024-04-14 RX ADMIN — DULOXETINE HYDROCHLORIDE 20 MG: 20 CAPSULE, DELAYED RELEASE ORAL at 09:32

## 2024-04-14 RX ADMIN — FERROUS GLUCONATE 38 MG OF IRON: 324 TABLET ORAL at 09:35

## 2024-04-14 RX ADMIN — SODIUM CHLORIDE 75 ML/HR: 9 INJECTION, SOLUTION INTRAVENOUS at 15:19

## 2024-04-14 RX ADMIN — SITAGLIPTIN 50 MG: 25 TABLET, FILM COATED ORAL at 09:54

## 2024-04-14 RX ADMIN — DOCUSATE SODIUM 50MG AND SENNOSIDES 8.6MG 1 TABLET: 8.6; 5 TABLET, FILM COATED ORAL at 09:51

## 2024-04-14 ASSESSMENT — COGNITIVE AND FUNCTIONAL STATUS - GENERAL
WALKING IN HOSPITAL ROOM: A LOT
DRESSING REGULAR UPPER BODY CLOTHING: A LOT
EATING MEALS: A LITTLE
MOBILITY SCORE: 11
MOVING FROM LYING ON BACK TO SITTING ON SIDE OF FLAT BED WITH BEDRAILS: A LOT
MOVING TO AND FROM BED TO CHAIR: A LOT
HELP NEEDED FOR BATHING: A LOT
PERSONAL GROOMING: A LITTLE
TOILETING: A LITTLE
TURNING FROM BACK TO SIDE WHILE IN FLAT BAD: A LOT
CLIMB 3 TO 5 STEPS WITH RAILING: TOTAL
DRESSING REGULAR LOWER BODY CLOTHING: A LOT
DAILY ACTIVITIY SCORE: 15
STANDING UP FROM CHAIR USING ARMS: A LOT

## 2024-04-14 ASSESSMENT — PAIN - FUNCTIONAL ASSESSMENT
PAIN_FUNCTIONAL_ASSESSMENT: 0-10
PAIN_FUNCTIONAL_ASSESSMENT: 0-10

## 2024-04-14 ASSESSMENT — PAIN SCALES - GENERAL
PAINLEVEL_OUTOF10: 4
PAINLEVEL_OUTOF10: 4
PAINLEVEL_OUTOF10: 0 - NO PAIN
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
## 2024-04-14 NOTE — PROGRESS NOTES
04/14/24 1021   Discharge Planning   Type of Post Acute Facility Services Other (Comment)  (inpatient [facility] receommended by psych)   Patient expects to be discharged to: inpatient [facility]     Chart reviewed, patient was living at home with family. She was having increase agitation being combative. Psychiatry completed evaluation and recommending inpatient [facility], Care transition team to continue to monitor care progression and address needs/ concerns as identified. ARNALDO Chatterjee

## 2024-04-14 NOTE — PROGRESS NOTES
"Mela Kumari is a 83 y.o. female on day 0 of admission presenting with Dementia due to Parkinson's disease, unspecified dementia severity, unspecified whether behavioral, psychotic, or mood disturbance or anxiety (Multi).    Subjective   Discussed the case with staff, she seems to be more alert and no agitation  The patient seemed more alert to me than yesterday.  Not agitated.  Still not oriented to place date or time.  She was able to tell me her name.  Denied any thoughts of suicide or homicide       Objective     Physical Exam  Psychiatric:         Attention and Perception: She is inattentive.         Mood and Affect: Mood is anxious.         Speech: Speech is not tangential.         Behavior: Behavior is cooperative.         Thought Content: Thought content does not include homicidal or suicidal ideation.         Cognition and Memory: Cognition is impaired.         Judgment: Judgment is impulsive and inappropriate.         Last Recorded Vitals  Blood pressure 140/64, pulse 88, temperature 36.4 °C (97.5 °F), temperature source Temporal, resp. rate 18, height 1.5 m (4' 11.06\"), weight 60.2 kg (132 lb 11.5 oz), SpO2 95%.  Intake/Output last 3 Shifts:  I/O last 3 completed shifts:  In: 3264.6 (54.2 mL/kg) [I.V.:3264.6 (54.2 mL/kg)]  Out: 750 (12.5 mL/kg) [Urine:750 (0.3 mL/kg/hr)]  Weight: 60.2 kg   Scheduled medications  atorvastatin, 20 mg, oral, Daily  carbidopa-levodopa, 2 tablet, oral, 4x daily  DULoxetine, 20 mg, oral, Daily  enoxaparin, 40 mg, subcutaneous, q24h  entacapone, 200 mg, oral, 4x daily  ferrous gluconate, 38 mg of iron, oral, Daily with breakfast  gabapentin, 300 mg, oral, Daily  insulin lispro, 0-5 Units, subcutaneous, TID with meals  mirtazapine, 7.5 mg, oral, Nightly  pantoprazole, 40 mg, oral, Daily before breakfast   Or  pantoprazole, 40 mg, intravenous, Daily before breakfast  polyethylene glycol, 17 g, oral, Daily  sennosides-docusate sodium, 1 tablet, oral, Daily  SITagliptin " phosphate, 50 mg, oral, Daily      Continuous medications  sodium chloride 0.9%, 75 mL/hr, Last Rate: 75 mL/hr (04/14/24 9145)      PRN medications  PRN medications: acetaminophen **OR** acetaminophen **OR** acetaminophen, dextrose, dextrose, glucagon, glucagon, haloperidol lactate, hydrALAZINE, melatonin, ondansetron ODT **OR** ondansetron, traMADol              Assessment/Plan   Principal Problem:    Dementia due to Parkinson's disease, unspecified dementia severity, unspecified whether behavioral, psychotic, or mood disturbance or anxiety (Multi)  Continue to address medical needs per primary team  Delirium precautions  No med changes from my end we will continue to monitor her symptoms.   to help with placement.  Inpatient geropsych vs SNF  Robert Feliciano MD

## 2024-04-14 NOTE — NURSING NOTE
Complete linen change done. Pt incon large amt of urine. Notified hospitalist of pt's refusal to do swallow eval and then take her meds..

## 2024-04-14 NOTE — PROGRESS NOTES
Speech-Language Pathology    Inpatient Speech-Language Pathology Clinical Swallow Evaluation       Patient Name: Mela Kumari  MRN: [id number]  : 1940  Today's Date: 24  Time Calculation  Start Time: 0950  Stop Time: 1020  Time Calculation (min): 30 min         Recommendations:  Diet: Regular foods with thin liquids   Medication Administration: PO      Assessment:  Consistencies Trialed: Regular, puree foods and thin liquids   Oral motor exam: Appeared to be WFL      Plan:  ST dysphagia evaluation was requested in order to rule out aspiration. Current diet status is regular foods with thin liquids.       Subjective   Pt. Seen at bedside for clinical swallow evaluation. Pt pleasant, awake with confusion      Pt General Information:  Pt. is an 83 year old female who was admitted to the hospital due to aggressive behavior. Pt was living at home with family. Diet status regular foods with thin liquids.      Past medical history: Dementia, Parkinson's Disease, HTN, DM      Pain:  Rating scale: 0-10   Pt. Report: 0      Assessment: During today's evaluation, pt was very pleasant and cooperative with confusion. Current diet status is regular foods with thin liquids. Per nurse, Pt has recently been very tired and lethargic. Therefore, ST dysphagia evaluation requested for analysis of safe diet tolerance. Oral motor function was WFL. Pt tolerated puree and regular foods, with thin liquids, without any s/s of silent or overt aspiration. NO coughing, watery eyes, runny nose, SOB or throat clearing exhibited. Pt able to feed herself independently with bites and sips small and appropriate. Pt denied any past swallowing issues. During assessment, nurse administered pills whole. No problems or difficulties noted. Diet status to remain regular foods with thin liquids. ST services not implemented at this time. Pt thanked this clinician for my time and informed me to have a nice day.        Inpatient  Education:  Learner: Patient          Method:  demonstration; verbal  Topic: Pt. educated on: safe swallow strategies, role of SLP, current swallow function, recommended diet  Outcome: Patient gave verbal understanding    This SLP notified nurse of today's assessment, diet status to remain regular foods with thin liquids and ST services not implemented at this time.

## 2024-04-15 ENCOUNTER — APPOINTMENT (OUTPATIENT)
Dept: NEUROLOGY | Facility: HOSPITAL | Age: 84
DRG: 056 | End: 2024-04-15
Payer: MEDICARE

## 2024-04-15 LAB
APPEARANCE UR: CLEAR
BILIRUB UR STRIP.AUTO-MCNC: NEGATIVE MG/DL
COLOR UR: YELLOW
GLUCOSE BLD MANUAL STRIP-MCNC: 153 MG/DL (ref 74–99)
GLUCOSE BLD MANUAL STRIP-MCNC: 161 MG/DL (ref 74–99)
GLUCOSE BLD MANUAL STRIP-MCNC: 162 MG/DL (ref 74–99)
GLUCOSE BLD MANUAL STRIP-MCNC: 165 MG/DL (ref 74–99)
GLUCOSE UR STRIP.AUTO-MCNC: NEGATIVE MG/DL
KETONES UR STRIP.AUTO-MCNC: NEGATIVE MG/DL
LEUKOCYTE ESTERASE UR QL STRIP.AUTO: ABNORMAL
MUCOUS THREADS #/AREA URNS AUTO: ABNORMAL /LPF
NITRITE UR QL STRIP.AUTO: NEGATIVE
PH UR STRIP.AUTO: 7 [PH]
PROT UR STRIP.AUTO-MCNC: NEGATIVE MG/DL
RBC # UR STRIP.AUTO: ABNORMAL /UL
RBC #/AREA URNS AUTO: ABNORMAL /HPF
SP GR UR STRIP.AUTO: 1
UROBILINOGEN UR STRIP.AUTO-MCNC: <2 MG/DL
WBC #/AREA URNS AUTO: >50 /HPF

## 2024-04-15 PROCEDURE — 99232 SBSQ HOSP IP/OBS MODERATE 35: CPT | Performed by: STUDENT IN AN ORGANIZED HEALTH CARE EDUCATION/TRAINING PROGRAM

## 2024-04-15 PROCEDURE — 82947 ASSAY GLUCOSE BLOOD QUANT: CPT

## 2024-04-15 PROCEDURE — 2500000004 HC RX 250 GENERAL PHARMACY W/ HCPCS (ALT 636 FOR OP/ED)

## 2024-04-15 PROCEDURE — 2500000004 HC RX 250 GENERAL PHARMACY W/ HCPCS (ALT 636 FOR OP/ED): Performed by: HOSPITALIST

## 2024-04-15 PROCEDURE — G0378 HOSPITAL OBSERVATION PER HR: HCPCS

## 2024-04-15 PROCEDURE — 81001 URINALYSIS AUTO W/SCOPE: CPT

## 2024-04-15 PROCEDURE — 95816 EEG AWAKE AND DROWSY: CPT | Performed by: PSYCHIATRY & NEUROLOGY

## 2024-04-15 PROCEDURE — 2500000002 HC RX 250 W HCPCS SELF ADMINISTERED DRUGS (ALT 637 FOR MEDICARE OP, ALT 636 FOR OP/ED): Mod: MUE | Performed by: HOSPITALIST

## 2024-04-15 PROCEDURE — 99223 1ST HOSP IP/OBS HIGH 75: CPT | Performed by: PSYCHIATRY & NEUROLOGY

## 2024-04-15 PROCEDURE — 95816 EEG AWAKE AND DROWSY: CPT

## 2024-04-15 PROCEDURE — 2500000001 HC RX 250 WO HCPCS SELF ADMINISTERED DRUGS (ALT 637 FOR MEDICARE OP)

## 2024-04-15 PROCEDURE — 96372 THER/PROPH/DIAG INJ SC/IM: CPT

## 2024-04-15 PROCEDURE — 87186 SC STD MICRODIL/AGAR DIL: CPT | Mod: ELYLAB | Performed by: STUDENT IN AN ORGANIZED HEALTH CARE EDUCATION/TRAINING PROGRAM

## 2024-04-15 PROCEDURE — 99232 SBSQ HOSP IP/OBS MODERATE 35: CPT | Performed by: REGISTERED NURSE

## 2024-04-15 PROCEDURE — 97116 GAIT TRAINING THERAPY: CPT | Mod: GP,CQ

## 2024-04-15 PROCEDURE — 2500000001 HC RX 250 WO HCPCS SELF ADMINISTERED DRUGS (ALT 637 FOR MEDICARE OP): Performed by: HOSPITALIST

## 2024-04-15 RX ADMIN — SODIUM CHLORIDE 75 ML/HR: 9 INJECTION, SOLUTION INTRAVENOUS at 16:25

## 2024-04-15 RX ADMIN — CARBIDOPA AND LEVODOPA 2 TABLET: 25; 100 TABLET ORAL at 21:37

## 2024-04-15 RX ADMIN — DOCUSATE SODIUM 50MG AND SENNOSIDES 8.6MG 1 TABLET: 8.6; 5 TABLET, FILM COATED ORAL at 08:35

## 2024-04-15 RX ADMIN — ENTACAPONE 200 MG: 200 TABLET ORAL at 12:18

## 2024-04-15 RX ADMIN — ENOXAPARIN SODIUM 40 MG: 40 INJECTION SUBCUTANEOUS at 12:18

## 2024-04-15 RX ADMIN — TRAMADOL HYDROCHLORIDE 50 MG: 50 TABLET, COATED ORAL at 21:37

## 2024-04-15 RX ADMIN — MIRTAZAPINE 7.5 MG: 15 TABLET, FILM COATED ORAL at 21:38

## 2024-04-15 RX ADMIN — INSULIN LISPRO 1 UNITS: 100 INJECTION, SOLUTION INTRAVENOUS; SUBCUTANEOUS at 12:18

## 2024-04-15 RX ADMIN — POLYETHYLENE GLYCOL 3350 17 G: 17 POWDER, FOR SOLUTION ORAL at 08:36

## 2024-04-15 RX ADMIN — INSULIN LISPRO 1 UNITS: 100 INJECTION, SOLUTION INTRAVENOUS; SUBCUTANEOUS at 16:52

## 2024-04-15 RX ADMIN — ENTACAPONE 200 MG: 200 TABLET ORAL at 16:52

## 2024-04-15 RX ADMIN — DULOXETINE HYDROCHLORIDE 20 MG: 20 CAPSULE, DELAYED RELEASE ORAL at 08:35

## 2024-04-15 RX ADMIN — FERROUS GLUCONATE 38 MG OF IRON: 324 TABLET ORAL at 08:35

## 2024-04-15 RX ADMIN — GABAPENTIN 300 MG: 300 CAPSULE ORAL at 08:36

## 2024-04-15 RX ADMIN — ATORVASTATIN CALCIUM 20 MG: 20 TABLET, FILM COATED ORAL at 08:35

## 2024-04-15 RX ADMIN — ENTACAPONE 200 MG: 200 TABLET ORAL at 21:38

## 2024-04-15 RX ADMIN — SITAGLIPTIN 50 MG: 25 TABLET, FILM COATED ORAL at 08:36

## 2024-04-15 RX ADMIN — CARBIDOPA AND LEVODOPA 2 TABLET: 25; 100 TABLET ORAL at 06:26

## 2024-04-15 RX ADMIN — CARBIDOPA AND LEVODOPA 2 TABLET: 25; 100 TABLET ORAL at 12:18

## 2024-04-15 RX ADMIN — INSULIN LISPRO 1 UNITS: 100 INJECTION, SOLUTION INTRAVENOUS; SUBCUTANEOUS at 06:47

## 2024-04-15 RX ADMIN — ENTACAPONE 200 MG: 200 TABLET ORAL at 06:26

## 2024-04-15 RX ADMIN — SODIUM CHLORIDE 75 ML/HR: 9 INJECTION, SOLUTION INTRAVENOUS at 02:43

## 2024-04-15 RX ADMIN — Medication 3 MG: at 21:38

## 2024-04-15 RX ADMIN — CARBIDOPA AND LEVODOPA 2 TABLET: 25; 100 TABLET ORAL at 16:52

## 2024-04-15 RX ADMIN — PANTOPRAZOLE SODIUM 40 MG: 40 TABLET, DELAYED RELEASE ORAL at 06:26

## 2024-04-15 ASSESSMENT — COGNITIVE AND FUNCTIONAL STATUS - GENERAL
MOVING FROM LYING ON BACK TO SITTING ON SIDE OF FLAT BED WITH BEDRAILS: A LITTLE
TURNING FROM BACK TO SIDE WHILE IN FLAT BAD: A LITTLE
CLIMB 3 TO 5 STEPS WITH RAILING: A LITTLE
STANDING UP FROM CHAIR USING ARMS: A LITTLE
MOVING TO AND FROM BED TO CHAIR: A LITTLE
WALKING IN HOSPITAL ROOM: A LITTLE
CLIMB 3 TO 5 STEPS WITH RAILING: TOTAL
MOBILITY SCORE: 16
MOBILITY SCORE: 23
DAILY ACTIVITIY SCORE: 24

## 2024-04-15 ASSESSMENT — PAIN SCALES - GENERAL
PAINLEVEL_OUTOF10: 0 - NO PAIN
PAINLEVEL_OUTOF10: 0 - NO PAIN
PAINLEVEL_OUTOF10: 7
PAINLEVEL_OUTOF10: 0 - NO PAIN
PAINLEVEL_OUTOF10: 0 - NO PAIN

## 2024-04-15 ASSESSMENT — ACTIVITIES OF DAILY LIVING (ADL)
ADEQUATE_TO_COMPLETE_ADL: YES
WALKS IN HOME: NEEDS ASSISTANCE
PATIENT'S MEMORY ADEQUATE TO SAFELY COMPLETE DAILY ACTIVITIES?: NO
TOILETING: NEEDS ASSISTANCE
JUDGMENT_ADEQUATE_SAFELY_COMPLETE_DAILY_ACTIVITIES: NO
BATHING: NEEDS ASSISTANCE
HEARING - LEFT EAR: FUNCTIONAL
FEEDING YOURSELF: INDEPENDENT
HEARING - RIGHT EAR: FUNCTIONAL
GROOMING: NEEDS ASSISTANCE
DRESSING YOURSELF: NEEDS ASSISTANCE

## 2024-04-15 ASSESSMENT — PAIN - FUNCTIONAL ASSESSMENT
PAIN_FUNCTIONAL_ASSESSMENT: 0-10

## 2024-04-15 ASSESSMENT — ENCOUNTER SYMPTOMS
AGITATION: 1
WEAKNESS: 1
CONFUSION: 1
DIZZINESS: 1

## 2024-04-15 ASSESSMENT — PAIN DESCRIPTION - DESCRIPTORS: DESCRIPTORS: ACHING

## 2024-04-15 ASSESSMENT — PAIN DESCRIPTION - LOCATION: LOCATION: FOOT

## 2024-04-15 NOTE — CONSULTS
History Of Present Illness  Mela Kumari is a 83 y.o. female presenting with acute change in mental status with agitation.  According to the caregiver patient was very physical and while in bed she called her son she was brought to the emergency room and she was quite delirious she was given Haldol which seem to have helped her.  Since coming to the hospital she is much more alert and awake    At the time of my evaluation she was done eating her dinner and knows where she lives and who is her physicians are    She has a longstanding history of dementia with Parkinson disease and currently on Sinemet Cymbalta Neurontin and entacapone.  She has been seen and followed by Dr. Schaeffer.     Please refer to the initial H&P and the ER records for details and please refer to the psych consult for details.    Past Medical History  Past Medical History:   Diagnosis Date    Abnormal weight loss 02/02/2020    Weight loss, unintentional    Anogenital (venereal) warts     Genital warts    Anorexia 04/11/2017    Appetite loss    Arthralgia 04/08/2023    Blister (nonthermal), left great toe, initial encounter 07/10/2017    Blister of great toe of left foot    Blister (nonthermal), right lesser toe(s), initial encounter 01/02/2017    Blister of toe of right foot without infection, initial encounter    Body mass index (BMI) 21.0-21.9, adult 09/04/2021    BMI 21.0-21.9, adult    Body mass index (BMI) 21.0-21.9, adult 02/02/2020    Body mass index (BMI) of 21.0 to 21.9 in adult    Body mass index (BMI) 22.0-22.9, adult 06/09/2021    BMI 22.0-22.9, adult    Body mass index (BMI) 22.0-22.9, adult     BMI 22.0-22.9, adult    Body mass index (BMI) 23.0-23.9, adult 02/01/2021    Body mass index (BMI) of 23.0 to 23.9 in adult    Bunion of unspecified foot     Bunion    Cellulitis of left toe 01/03/2017    Paronychia of toe of left foot    Cellulitis, unspecified     Cellulitis    Chest pain 11/07/2022    COVID-19 12/29/2022    COVID-19  virus infection    COVID-19 virus infection 02/15/2023    Diarrhea, unspecified 06/19/2019    Acute diarrhea    Encounter for immunization 09/01/2016    Need for zoster vaccine    Encounter for other screening for malignant neoplasm of breast 10/19/2017    Screening for breast cancer    HTN (hypertension) 09/08/2022    Impacted cerumen, left ear 10/19/2017    Impacted cerumen of left ear    Impacted cerumen, right ear 10/23/2012    Impacted cerumen of right ear    Ingrowing nail 01/03/2017    Ingrown nail of great toe of left foot    Localized edema 02/02/2020    Pedal edema    Other conditions influencing health status     Menopause    Other conditions influencing health status     Reported Positive Pap Smear    Other long term (current) drug therapy 10/23/2018    Current use of beta blocker    Other specified abnormal findings of blood chemistry 01/26/2021    Elevated serum creatinine    Other specified abnormal findings of blood chemistry 07/10/2017    Azotemia    Other specified symptoms and signs involving the circulatory and respiratory systems 02/15/2018    Decreased pulses in feet    Peripheral vascular disease, unspecified (CMS-HCC) 01/26/2021    Cold foot with peripheral vascular disease    Personal history of diseases of the blood and blood-forming organs and certain disorders involving the immune mechanism 06/26/2022    History of anemia    Personal history of other diseases of the circulatory system 10/03/2021    History of hypotension    Personal history of other diseases of the musculoskeletal system and connective tissue     History of polymyalgia rheumatica    Personal history of other drug therapy 09/06/2018    History of pneumococcal vaccination    Personal history of other endocrine, nutritional and metabolic disease 06/26/2022    History of iron deficiency    Personal history of other infectious and parasitic diseases     History of athlete's foot    Personal history of other malignant neoplasm  of skin 12/29/2022    History of other malignant neoplasm of skin    Personal history of other specified conditions 04/12/2022    History of tachycardia    Personal history of other specified conditions 02/15/2018    History of weight loss    Personal history of other specified conditions 02/18/2019    History of bradycardia    Personal history of other specified conditions 09/06/2018    History of abnormal weight loss    Personal history of other specified conditions 10/19/2017    History of abnormal mammogram    Personal history of other specified conditions 05/20/2013    History of breast lump    Pyuria 01/23/2015    Pyuria    Radial styloid tenosynovitis 04/08/2023       Surgical History  Past Surgical History:   Procedure Laterality Date    FOOT SURGERY  06/14/2012    Foot Surgery    MR HEAD ANGIO WO IV CONTRAST  10/28/2020    MR HEAD ANGIO WO IV CONTRAST 10/28/2020 [facility]    OTHER SURGICAL HISTORY  06/14/2012    Screening for malignant neoplasms, colon       Social History  Social History     Tobacco Use    Smoking status: Never    Smokeless tobacco: Never   Vaping Use    Vaping status: Never Used   Substance Use Topics    Alcohol use: Never    Drug use: Never       Allergies  Patient has no known allergies.    Medications Prior to Admission   Medication Sig Dispense Refill Last Dose    carbidopa-levodopa (Sinemet)  mg tablet Take 2 tablets by mouth. 4 times daily 1aj-34rd-0te-7pm   4/12/2024    cholecalciferol (Vitamin D-3) 25 MCG (1000 UT) capsule Take 1 capsule (25 mcg) by mouth once daily.   4/12/2024    cyanocobalamin (Vitamin B-12) 500 mcg tablet Take 1 tablet (500 mcg) by mouth once daily.   4/12/2024    ferrous gluconate 324 (38 Fe) mg tablet Take by mouth. change to one tab mon wed friday, 3 days per week as cbc ok and iron levels up feb 23, 2022 4/12/2024    mirtazapine (Remeron) 7.5 mg tablet Take 1 tablet (7.5 mg) by mouth once daily at bedtime.   4/12/2024    nutritional  drink (Ensure High Protein) liquid Take 8 oz by mouth.   4/12/2024    polyethylene glycol (Glycolax, Miralax) 17 gram packet Take 17 g by mouth once daily.   4/12/2024    traMADol (Ultram) 50 mg tablet Take 1 tablet (50 mg) by mouth. twice a day as needed   4/12/2024    acetaminophen (Tylenol) 325 mg tablet Take 2 tablets (650 mg) by mouth every 6 hours if needed for mild pain (1 - 3) or moderate pain (4 - 6).       acetaminophen (Tylenol) 500 mg tablet Take 1 tablet (500 mg) by mouth 3 times a day.       atorvastatin (Lipitor) 20 mg tablet Take 1 tablet (20 mg) by mouth once daily. 90 tablet 3     DULoxetine (Cymbalta) 20 mg DR capsule Take 1 capsule (20 mg) by mouth once daily. Do not crush or chew.       entacapone (Comtan) 200 mg tablet Take 1 tablet (200 mg) by mouth 4 times a day. 7am 11am 3pm 7pm       gabapentin (Neurontin) 300 mg capsule Take 1 capsule (300 mg) by mouth once daily. At bedtime 1x week, then take 1 capsule twice daily x1 week, then take 1 capsule 3 times daily--pt says only taking one per day 7/6/23/kmm       insulin lispro (HumaLOG) 100 unit/mL injection Inject 0.01 mL (1 Units) under the skin 4 times a day before meals. Take as directed per insulin instructions.  0-150= 0 units  151-200= 1 units  201-250= 2 units  251-300= 3 units  301-350= 4 units  351 or greater= 5 units       metFORMIN (Glucophage) 500 mg tablet Take 1 tablet (500 mg) by mouth once daily with breakfast.       omeprazole OTC (PriLOSEC OTC) 20 mg EC tablet Take 1 tablet (20 mg) by mouth once daily. 90 tablet 1     psyllium (Metamucil) 3.4 gram packet One packet once per day in 8 oz of water. 60 packet 2     sennosides-docusate sodium (Dahlia-Colace) 8.6-50 mg tablet Take 1 tablet by mouth once daily.       SITagliptin phosphate (Januvia) 50 mg tablet Take 1 tablet (50 mg) by mouth once daily. 90 tablet 1        Review of Systems   Neurological:  Positive for dizziness and weakness.   Psychiatric/Behavioral:  Positive for  "agitation and confusion.        Physical Exam  She was thin cachectic not in any acute distress.  HEENT there is pupils to be equal and reactive to light.  Ear nose and throat was unremarkable.  Neck was supple and I could not hear any carotid bruits.    Cardiovascular exam normal S1-S2 with clear breath sounds bilaterally    Abdomen was soft scaphoid with no organomegaly    Extremities generalized wasting but she was able to move all 4 extremities no edema cyanosis.      Neurological Exam  She was alert awake oriented to herself.  She was pleasantly confused but knew she was at [facility].  Speech was slow but dysarthric.  Memory was impaired according to the short-term.  Attention span judgment concentration was poor.  No hallucinations or delusions at time of my evaluation    Cranial nerves reveal normal extraocular with face being symmetrical tongue in the midline and patient was able to elevate the palate and shoulder without difficulty.  Pupils were reactive difficulty.  Difficult to examine the optic disc and funduscopy.  Patient had normal sensation of the face.    Motor exam revealed normal to slightly increased tone with generalized wasting but she was able to move all 4 extremities    Reflexes were bilaterally hypoactive with plantar equivocal response bilaterally    Patient Station and gait were deferred    Sensory exam was intact to light touch and pinprick.          Last Recorded Vitals  Blood pressure 150/77, pulse 88, temperature 36.8 °C (98.2 °F), resp. rate 18, height 1.5 m (4' 11.06\"), weight 60.2 kg (132 lb 11.5 oz), SpO2 94%.    Relevant Results  Recent Results (from the past 24 hour(s))   POCT GLUCOSE    Collection Time: 04/14/24  7:25 PM   Result Value Ref Range    POCT Glucose 158 (H) 74 - 99 mg/dL   POCT GLUCOSE    Collection Time: 04/15/24  6:22 AM   Result Value Ref Range    POCT Glucose 153 (H) 74 - 99 mg/dL   POCT GLUCOSE    Collection Time: 04/15/24 11:05 AM   Result Value Ref " Range    POCT Glucose 161 (H) 74 - 99 mg/dL   Urinalysis with Reflex Microscopic    Collection Time: 04/15/24  1:34 PM   Result Value Ref Range    Color, Urine Yellow Straw, Yellow    Appearance, Urine Clear Clear    Specific Gravity, Urine 1.004 (N) 1.005 - 1.035    pH, Urine 7.0 5.0, 5.5, 6.0, 6.5, 7.0, 7.5, 8.0    Protein, Urine NEGATIVE NEGATIVE mg/dL    Glucose, Urine NEGATIVE NEGATIVE mg/dL    Blood, Urine SMALL (1+) (A) NEGATIVE    Ketones, Urine NEGATIVE NEGATIVE mg/dL    Bilirubin, Urine NEGATIVE NEGATIVE    Urobilinogen, Urine <2.0 <2.0 mg/dL    Nitrite, Urine NEGATIVE NEGATIVE    Leukocyte Esterase, Urine LARGE (3+) (A) NEGATIVE   Microscopic Only, Urine    Collection Time: 04/15/24  1:34 PM   Result Value Ref Range    WBC, Urine >50 (A) 1-5, NONE /HPF    RBC, Urine 1-2 NONE, 1-2, 3-5 /HPF    Mucus, Urine 1+ Reference range not established. /LPF   POCT GLUCOSE    Collection Time: 04/15/24  4:46 PM   Result Value Ref Range    POCT Glucose 162 (H) 74 - 99 mg/dL                       [place] Coma Scale  Best Eye Response: To verbal stimuli  Best Verbal Response: Confused  Best Motor Response: Localizes pain  [place] Coma Scale Score: 12                 XR chest 1 view    Result Date: 4/13/2024  Interpreted By:  Dylan Goldberg, STUDY: XR CHEST 1 VIEW;  4/13/2024 12:18 am   INDICATION: Signs/Symptoms:dyspnea.   COMPARISON: None.   ACCESSION NUMBER(S): [id number]   ORDERING CLINICIAN: SHELLEY SOMMER   FINDINGS: SUPPORT DEVICES: None.   CARDIOMEDIASTINAL SILHOUETTE: Cardiomediastinal silhouette is normal in size and configuration.   LUNGS: No pulmonary consolidation, pleural effusion or pneumothorax.   ABDOMEN: No remarkable upper abdominal findings.   BONES: No acute osseous abnormality.       1. No acute cardiopulmonary abnormality.     Signed by: Dylan Goldberg 4/13/2024 12:29 AM Dictation workstation:   [id number]     I have personally reviewed the following imaging results EEG    Result Date:  4/15/2024  IMPRESSION This routine EEG is normal. No epileptiform discharges or lateralizing signs are seen. However, clinical correlation is strongly recommended. This report has been interpreted and electronically signed by    XR chest 1 view    Result Date: 4/13/2024  Interpreted By:  Dylan Goldberg, STUDY: XR CHEST 1 VIEW;  4/13/2024 12:18 am   INDICATION: Signs/Symptoms:dyspnea.   COMPARISON: None.   ACCESSION NUMBER(S): [id number]   ORDERING CLINICIAN: SHELLEY SOMMER   FINDINGS: SUPPORT DEVICES: None.   CARDIOMEDIASTINAL SILHOUETTE: Cardiomediastinal silhouette is normal in size and configuration.   LUNGS: No pulmonary consolidation, pleural effusion or pneumothorax.   ABDOMEN: No remarkable upper abdominal findings.   BONES: No acute osseous abnormality.       1. No acute cardiopulmonary abnormality.     Signed by: Dylan Goldberg 4/13/2024 12:29 AM Dictation workstation:   [id number]  .      Assessment/Plan   1.  History of acute change in mental status most likely acute encephalopathy probably worsening of existing Parkinson dementia syndrome needs to rule out UTI or other treatable causes    2.  History of hypertension with coronary disease    Plan.  Continue with current treatment.  I would not like to change any of her Parkinson's medication continue with PT OT evaluation social service for placement patient may benefit from an inpatient [facility] or skilled nursing facility    EEG was done which did not show any spike-wave discharges    Will follow with you till discharge    Due to technical limitations of voice recognition and human error, this note may not accurately reflect the care of the patient.                   Miguel Angel Pate MD

## 2024-04-15 NOTE — PROGRESS NOTES
Physical Therapy    Physical Therapy Treatment    Patient Name: Mela Kumari  MRN: [id number]  Today's Date: 4/15/2024  Time Calculation  Start Time: 1106  Stop Time: 1122  Time Calculation (min): 16 min       Assessment/Plan   End of Session Communication: Bedside nurse, PCT/NA/CTA    End of Session Patient Position:  (Patient sitting up in chair after treatment.  Call light and tray with lunch in reach.  Chair alarm on. 1:1 at bedside)    PT Plan  Inpatient/Swing Bed or Outpatient: Inpatient  Treatment/Interventions: Bed mobility, Transfer training, Gait training, Therapeutic exercise  PT Plan: Skilled PT  PT Frequency: 3 times per week  PT Discharge Recommendations: Moderate intensity level of continued care  Equipment Recommended upon Discharge: Wheeled walker PT Recommended Transfer Status: Assist x1    General Visit Information:   PT  Visit  PT Received On: 04/15/24    General  Prior to Session Communication:  (Cleared by RN for PT)  Patient Position Received:  (Patient presents in bed, agreeable to PT)    General Comment: Pt admitted from home with caregiver duet to agitation and combativeness. Parkinson's dementia.    General Observations:   General Observation:  (1:1 sitter, ruthann, IV, chair alarm)    Precautions:  Precautions  Medical Precautions: Fall precautions    Pain:  Pain Assessment  Pain Assessment: 0-10  Pain Score: 0 - No pain    Cognition:  Cognition  Overall Cognitive Status: Impaired (Flat affect; Patient follows commands with repeition)  Processing Speed: Delayed      Balance:   Static Sitting Balance  Static Sitting-Level of Assistance:  (Fair+)  Static Sitting-Comment/Number of Minutes: Fair +  Dynamic Sitting Balance  Dynamic Sitting-Balance:  (Fair+)  Dynamic Sitting-Comments: Fair  Static Standing Balance  Static Standing-Comment/Number of Minutes: Fair -  Dynamic Standing Balance  Dynamic Standing-Comments:  (Fair- with ww support)      Activity Tolerance:  Activity  Tolerance  Endurance:  (Reports fatigue with activity)    Therapeutic Exercise  Therapeutic Exercise Performed:  (LE ther-ex not performed secondary to lunch arriving)      Bed Mobility  Bed Mobility:  (supine-->sit: SBAx1  HOB raised 45 degrees.  Patient utilizes bed rails to pull herself over with. Increased time needed to complete transfer.  She required frequent cues to remain on task.)    Ambulation/Gait Training  Ambulation/Gait Training Performed:  (Patient amb 15' with ww and min x1.  Slow champ, NBOS.  Adopts fwd flexed posture.  Decreased step length bilat. She had one posterior LOB while turning around with ww, min x1 to correct.)    Transfers  Transfer:  (sit-->stand: min x1  One stand performed from EOB. Instructions needed for hand placement with good follow through.    stand-->sit: min x1  Patient attempting to sit prematurely before all the way to chair.  Assist needed to pull chair closer to patient to allow for safe transfer          Outcome Measures:  [facility] Basic Mobility  Turning from your back to your side while in a flat bed without using bedrails: A little  Moving from lying on your back to sitting on the side of a flat bed without using bedrails: A little  Moving to and from bed to chair (including a wheelchair): A little  Standing up from a chair using your arms (e.g. wheelchair or bedside chair): A little  To walk in hospital room: A little  Climbing 3-5 steps with railing: Total  Basic Mobility - Total Score: 16    Education Documentation  Mobility Training, taught by Jenny Alejandro PTA at 4/15/2024 11:38 AM.  Learner: Patient  Readiness: Acceptance  Method: Explanation, Demonstration  Response: Needs Reinforcement      Encounter Problems       Encounter Problems (Active)       PT Problem       Pt will demonstrate CGA with all bed mobility   (Progressing)       Start:  04/14/24    Expected End:  04/28/24            Pt will demonstrate sit to stand and bed/chair transfers with min A with  WW.   (Progressing)       Start:  04/14/24    Expected End:  04/28/24            Pt will ambulate 15 feet x2 with WW with min A  with no LOB  (Progressing)       Start:  04/14/24    Expected End:  04/28/24            Pt will tolerate 15 reps x2 LE therapeutic exercise for LE strengthening and endurance  (Not Progressing)       Start:  04/14/24    Expected End:  04/28/24               Pain - Adult

## 2024-04-15 NOTE — PROGRESS NOTES
"Mela Kumari is a 83 y.o. female on day 0 of admission presenting with Parkinson's with dementia.    Subjective   Psych initially consulted as patient exhibiting agitation when she arrived to the ED.  Patient behavior has been much improved, no longer agitated.  Today patient was alert to self, setting, and was able to identify the month is April.  Patient did not knowledge that she was depressed and had a flat affect.  Patient reports that she has been experiencing suicidal ideation for the past 2 months as she feels like a burden to her family and states they are not \"sympathetic\".    Objective     MSE  General: Appropriately groomed and dressed.  Appearance: Appears stated age.  Attitude: Calm, cooperative.  Behavior: Appropriate eye contact.  Motor activity: No agitation or retardation. no EPS.  Normal gait.  Speech: Regular rate, rhythm, volume and tone.  Mood: Depressed  Affect: Flat  Thought process: Organized, linear, goal-directed.  Associations are logical.  Thought content: Endorses suicidal ideation  Thought perception: Did not endorse auditory or visual hallucinations.  Cognition: Alert, oriented x3.  no deficit in memory or attention.  Insight: Poor  Judgment: Impaired    Last Recorded Vitals  /65 (BP Location: Left arm, Patient Position: Lying)   Pulse 99   Temp 36.5 °C (97.7 °F) (Temporal)   Resp 16   Ht 1.5 m (4' 11.06\")   Wt 60.2 kg (132 lb 11.5 oz)   SpO2 93%   BMI 26.76 kg/m²      Relevant Results  Scheduled medications  atorvastatin, 20 mg, oral, Daily  carbidopa-levodopa, 2 tablet, oral, 4x daily  DULoxetine, 20 mg, oral, Daily  enoxaparin, 40 mg, subcutaneous, q24h  entacapone, 200 mg, oral, 4x daily  ferrous gluconate, 38 mg of iron, oral, Daily with breakfast  gabapentin, 300 mg, oral, Daily  insulin lispro, 0-5 Units, subcutaneous, TID with meals  mirtazapine, 7.5 mg, oral, Nightly  pantoprazole, 40 mg, oral, Daily before breakfast   Or  pantoprazole, 40 mg, intravenous, " Daily before breakfast  polyethylene glycol, 17 g, oral, Daily  sennosides-docusate sodium, 1 tablet, oral, Daily  SITagliptin phosphate, 50 mg, oral, Daily      Continuous medications  sodium chloride 0.9%, 75 mL/hr, Last Rate: 75 mL/hr (04/15/24 1625)      PRN medications  PRN medications: acetaminophen **OR** acetaminophen **OR** acetaminophen, dextrose, dextrose, glucagon, glucagon, haloperidol lactate, hydrALAZINE, melatonin, ondansetron ODT **OR** ondansetron, traMADol    Results for orders placed or performed during the hospital encounter of 04/12/24 (from the past 24 hour(s))   POCT GLUCOSE   Result Value Ref Range    POCT Glucose 193 (H) 74 - 99 mg/dL   POCT GLUCOSE   Result Value Ref Range    POCT Glucose 158 (H) 74 - 99 mg/dL   POCT GLUCOSE   Result Value Ref Range    POCT Glucose 153 (H) 74 - 99 mg/dL   POCT GLUCOSE   Result Value Ref Range    POCT Glucose 161 (H) 74 - 99 mg/dL   Urinalysis with Reflex Microscopic   Result Value Ref Range    Color, Urine Yellow Straw, Yellow    Appearance, Urine Clear Clear    Specific Gravity, Urine 1.004 (N) 1.005 - 1.035    pH, Urine 7.0 5.0, 5.5, 6.0, 6.5, 7.0, 7.5, 8.0    Protein, Urine NEGATIVE NEGATIVE mg/dL    Glucose, Urine NEGATIVE NEGATIVE mg/dL    Blood, Urine SMALL (1+) (A) NEGATIVE    Ketones, Urine NEGATIVE NEGATIVE mg/dL    Bilirubin, Urine NEGATIVE NEGATIVE    Urobilinogen, Urine <2.0 <2.0 mg/dL    Nitrite, Urine NEGATIVE NEGATIVE    Leukocyte Esterase, Urine LARGE (3+) (A) NEGATIVE   Microscopic Only, Urine   Result Value Ref Range    WBC, Urine >50 (A) 1-5, NONE /HPF    RBC, Urine 1-2 NONE, 1-2, 3-5 /HPF    Mucus, Urine 1+ Reference range not established. /LPF        Assessment/Plan   Diagnosis:  Parkinson's with dementia, depression    Recommendations:  -- Patient DOES meet criteria for inpatient geriatric psychiatric admission. Once patient is deemed medically cleared, please document clearly in note that patient is MEDICALLY CLEARED and contact  EPAT.     -- Medications:           -Continue Remeron and Cymbalta    -- Discussed recommendations with primary team.    KAREN Martinez-CNP
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
## 2024-04-16 ENCOUNTER — APPOINTMENT (OUTPATIENT)
Dept: CARDIOLOGY | Facility: HOSPITAL | Age: 84
DRG: 056 | End: 2024-04-16
Payer: MEDICARE

## 2024-04-16 PROBLEM — F03.918 AGGRESSIVE BEHAVIOR DUE TO DEMENTIA (MULTI): Status: ACTIVE | Noted: 2024-04-16

## 2024-04-16 LAB
ALBUMIN SERPL BCP-MCNC: 3.2 G/DL (ref 3.4–5)
ALP SERPL-CCNC: 78 U/L (ref 33–136)
ALT SERPL W P-5'-P-CCNC: <3 U/L (ref 7–45)
AMPHETAMINES UR QL SCN: NORMAL
ANION GAP SERPL CALC-SCNC: 9 MMOL/L (ref 10–20)
AST SERPL W P-5'-P-CCNC: 14 U/L (ref 9–39)
BARBITURATES UR QL SCN: NORMAL
BENZODIAZ UR QL SCN: NORMAL
BILIRUB SERPL-MCNC: 0.4 MG/DL (ref 0–1.2)
BUN SERPL-MCNC: 14 MG/DL (ref 6–23)
BZE UR QL SCN: NORMAL
CALCIUM SERPL-MCNC: 7.9 MG/DL (ref 8.6–10.3)
CANNABINOIDS UR QL SCN: NORMAL
CHLORIDE SERPL-SCNC: 109 MMOL/L (ref 98–107)
CO2 SERPL-SCNC: 27 MMOL/L (ref 21–32)
CREAT SERPL-MCNC: 0.68 MG/DL (ref 0.5–1.05)
EGFRCR SERPLBLD CKD-EPI 2021: 88 ML/MIN/1.73M*2
ERYTHROCYTE [DISTWIDTH] IN BLOOD BY AUTOMATED COUNT: 13.1 % (ref 11.5–14.5)
FENTANYL+NORFENTANYL UR QL SCN: NORMAL
GLUCOSE BLD MANUAL STRIP-MCNC: 134 MG/DL (ref 74–99)
GLUCOSE BLD MANUAL STRIP-MCNC: 134 MG/DL (ref 74–99)
GLUCOSE BLD MANUAL STRIP-MCNC: 140 MG/DL (ref 74–99)
GLUCOSE BLD MANUAL STRIP-MCNC: 152 MG/DL (ref 74–99)
GLUCOSE SERPL-MCNC: 133 MG/DL (ref 74–99)
HCT VFR BLD AUTO: 30.4 % (ref 36–46)
HGB BLD-MCNC: 10.4 G/DL (ref 12–16)
HOLD SPECIMEN: NORMAL
HOLD SPECIMEN: NORMAL
MAGNESIUM SERPL-MCNC: 1.45 MG/DL (ref 1.6–2.4)
MCH RBC QN AUTO: 32.7 PG (ref 26–34)
MCHC RBC AUTO-ENTMCNC: 34.2 G/DL (ref 32–36)
MCV RBC AUTO: 96 FL (ref 80–100)
METHADONE UR QL SCN: NORMAL
NRBC BLD-RTO: 0 /100 WBCS (ref 0–0)
OPIATES UR QL SCN: NORMAL
OXYCODONE+OXYMORPHONE UR QL SCN: NORMAL
PCP UR QL SCN: NORMAL
PLATELET # BLD AUTO: 219 X10*3/UL (ref 150–450)
POTASSIUM SERPL-SCNC: 3.8 MMOL/L (ref 3.5–5.3)
PROT SERPL-MCNC: 5.5 G/DL (ref 6.4–8.2)
RBC # BLD AUTO: 3.18 X10*6/UL (ref 4–5.2)
SARS-COV-2 RNA RESP QL NAA+PROBE: NOT DETECTED
SODIUM SERPL-SCNC: 141 MMOL/L (ref 136–145)
WBC # BLD AUTO: 5.3 X10*3/UL (ref 4.4–11.3)

## 2024-04-16 PROCEDURE — 2500000006 HC RX 250 W HCPCS SELF ADMINISTERED DRUGS (ALT 637 FOR ALL PAYERS): Performed by: HOSPITALIST

## 2024-04-16 PROCEDURE — 87635 SARS-COV-2 COVID-19 AMP PRB: CPT | Performed by: STUDENT IN AN ORGANIZED HEALTH CARE EDUCATION/TRAINING PROGRAM

## 2024-04-16 PROCEDURE — 2500000004 HC RX 250 GENERAL PHARMACY W/ HCPCS (ALT 636 FOR OP/ED)

## 2024-04-16 PROCEDURE — 93005 ELECTROCARDIOGRAM TRACING: CPT

## 2024-04-16 PROCEDURE — 85027 COMPLETE CBC AUTOMATED: CPT | Performed by: STUDENT IN AN ORGANIZED HEALTH CARE EDUCATION/TRAINING PROGRAM

## 2024-04-16 PROCEDURE — 93010 ELECTROCARDIOGRAM REPORT: CPT | Performed by: INTERNAL MEDICINE

## 2024-04-16 PROCEDURE — 2500000001 HC RX 250 WO HCPCS SELF ADMINISTERED DRUGS (ALT 637 FOR MEDICARE OP): Performed by: HOSPITALIST

## 2024-04-16 PROCEDURE — 80307 DRUG TEST PRSMV CHEM ANLYZR: CPT | Performed by: STUDENT IN AN ORGANIZED HEALTH CARE EDUCATION/TRAINING PROGRAM

## 2024-04-16 PROCEDURE — 99232 SBSQ HOSP IP/OBS MODERATE 35: CPT | Performed by: REGISTERED NURSE

## 2024-04-16 PROCEDURE — 96372 THER/PROPH/DIAG INJ SC/IM: CPT | Performed by: HOSPITALIST

## 2024-04-16 PROCEDURE — C9113 INJ PANTOPRAZOLE SODIUM, VIA: HCPCS

## 2024-04-16 PROCEDURE — 83735 ASSAY OF MAGNESIUM: CPT | Performed by: STUDENT IN AN ORGANIZED HEALTH CARE EDUCATION/TRAINING PROGRAM

## 2024-04-16 PROCEDURE — 2500000004 HC RX 250 GENERAL PHARMACY W/ HCPCS (ALT 636 FOR OP/ED): Performed by: HOSPITALIST

## 2024-04-16 PROCEDURE — 80053 COMPREHEN METABOLIC PANEL: CPT | Performed by: STUDENT IN AN ORGANIZED HEALTH CARE EDUCATION/TRAINING PROGRAM

## 2024-04-16 PROCEDURE — 99232 SBSQ HOSP IP/OBS MODERATE 35: CPT

## 2024-04-16 PROCEDURE — 2500000004 HC RX 250 GENERAL PHARMACY W/ HCPCS (ALT 636 FOR OP/ED): Performed by: NURSE PRACTITIONER

## 2024-04-16 PROCEDURE — 82947 ASSAY GLUCOSE BLOOD QUANT: CPT

## 2024-04-16 PROCEDURE — 2500000001 HC RX 250 WO HCPCS SELF ADMINISTERED DRUGS (ALT 637 FOR MEDICARE OP)

## 2024-04-16 PROCEDURE — 1210000001 HC SEMI-PRIVATE ROOM DAILY

## 2024-04-16 PROCEDURE — 99232 SBSQ HOSP IP/OBS MODERATE 35: CPT | Performed by: STUDENT IN AN ORGANIZED HEALTH CARE EDUCATION/TRAINING PROGRAM

## 2024-04-16 PROCEDURE — 36415 COLL VENOUS BLD VENIPUNCTURE: CPT | Performed by: STUDENT IN AN ORGANIZED HEALTH CARE EDUCATION/TRAINING PROGRAM

## 2024-04-16 RX ORDER — CEPHALEXIN 500 MG/1
500 CAPSULE ORAL 2 TIMES DAILY
Start: 2024-04-16 | End: 2024-04-21

## 2024-04-16 RX ORDER — CEFTRIAXONE 1 G/50ML
1 INJECTION, SOLUTION INTRAVENOUS EVERY 24 HOURS
Status: DISCONTINUED | OUTPATIENT
Start: 2024-04-16 | End: 2024-04-17 | Stop reason: HOSPADM

## 2024-04-16 RX ADMIN — ENOXAPARIN SODIUM 40 MG: 40 INJECTION SUBCUTANEOUS at 14:14

## 2024-04-16 RX ADMIN — MIRTAZAPINE 7.5 MG: 15 TABLET, FILM COATED ORAL at 20:43

## 2024-04-16 RX ADMIN — ENTACAPONE 200 MG: 200 TABLET ORAL at 06:28

## 2024-04-16 RX ADMIN — SITAGLIPTIN 50 MG: 25 TABLET, FILM COATED ORAL at 10:51

## 2024-04-16 RX ADMIN — CEFTRIAXONE SODIUM 1 G: 1 INJECTION, SOLUTION INTRAVENOUS at 06:28

## 2024-04-16 RX ADMIN — CARBIDOPA AND LEVODOPA 2 TABLET: 25; 100 TABLET ORAL at 06:28

## 2024-04-16 RX ADMIN — GABAPENTIN 300 MG: 300 CAPSULE ORAL at 10:51

## 2024-04-16 RX ADMIN — HALOPERIDOL LACTATE 2 MG: 5 INJECTION, SOLUTION INTRAMUSCULAR at 04:33

## 2024-04-16 RX ADMIN — ENTACAPONE 200 MG: 200 TABLET ORAL at 20:43

## 2024-04-16 RX ADMIN — PANTOPRAZOLE SODIUM 40 MG: 40 INJECTION, POWDER, FOR SOLUTION INTRAVENOUS at 06:27

## 2024-04-16 RX ADMIN — DULOXETINE HYDROCHLORIDE 20 MG: 20 CAPSULE, DELAYED RELEASE ORAL at 10:51

## 2024-04-16 RX ADMIN — SODIUM CHLORIDE 75 ML/HR: 9 INJECTION, SOLUTION INTRAVENOUS at 04:47

## 2024-04-16 RX ADMIN — CARBIDOPA AND LEVODOPA 2 TABLET: 25; 100 TABLET ORAL at 20:43

## 2024-04-16 RX ADMIN — FERROUS GLUCONATE 38 MG OF IRON: 324 TABLET ORAL at 10:55

## 2024-04-16 RX ADMIN — ATORVASTATIN CALCIUM 20 MG: 20 TABLET, FILM COATED ORAL at 10:55

## 2024-04-16 RX ADMIN — DOCUSATE SODIUM 50MG AND SENNOSIDES 8.6MG 1 TABLET: 8.6; 5 TABLET, FILM COATED ORAL at 10:55

## 2024-04-16 SDOH — SOCIAL STABILITY: SOCIAL INSECURITY: HAVE YOU HAD THOUGHTS OF HARMING ANYONE ELSE?: NO

## 2024-04-16 SDOH — SOCIAL STABILITY: SOCIAL INSECURITY: WERE YOU ABLE TO COMPLETE ALL THE BEHAVIORAL HEALTH SCREENINGS?: YES

## 2024-04-16 ASSESSMENT — ACTIVITIES OF DAILY LIVING (ADL)
ADEQUATE_TO_COMPLETE_ADL: YES
LACK_OF_TRANSPORTATION: NO

## 2024-04-16 ASSESSMENT — COGNITIVE AND FUNCTIONAL STATUS - GENERAL
WALKING IN HOSPITAL ROOM: A LOT
STANDING UP FROM CHAIR USING ARMS: A LOT
PERSONAL GROOMING: A LOT
MOVING TO AND FROM BED TO CHAIR: A LOT
MOBILITY SCORE: 11
DRESSING REGULAR LOWER BODY CLOTHING: A LOT
EATING MEALS: A LOT
DAILY ACTIVITIY SCORE: 10
MOVING FROM LYING ON BACK TO SITTING ON SIDE OF FLAT BED WITH BEDRAILS: A LOT
TURNING FROM BACK TO SIDE WHILE IN FLAT BAD: A LOT
TOILETING: TOTAL
CLIMB 3 TO 5 STEPS WITH RAILING: TOTAL
DRESSING REGULAR UPPER BODY CLOTHING: A LOT
PATIENT BASELINE BEDBOUND: NO
HELP NEEDED FOR BATHING: TOTAL

## 2024-04-16 ASSESSMENT — PATIENT HEALTH QUESTIONNAIRE - PHQ9
2. FEELING DOWN, DEPRESSED OR HOPELESS: NOT AT ALL
SUM OF ALL RESPONSES TO PHQ9 QUESTIONS 1 & 2: 0
1. LITTLE INTEREST OR PLEASURE IN DOING THINGS: NOT AT ALL

## 2024-04-16 ASSESSMENT — LIFESTYLE VARIABLES
HOW OFTEN DO YOU HAVE 6 OR MORE DRINKS ON ONE OCCASION: NEVER
AUDIT-C TOTAL SCORE: 0
HOW OFTEN DO YOU HAVE A DRINK CONTAINING ALCOHOL: NEVER
SKIP TO QUESTIONS 9-10: 1
AUDIT-C TOTAL SCORE: 0
HOW MANY STANDARD DRINKS CONTAINING ALCOHOL DO YOU HAVE ON A TYPICAL DAY: PATIENT DOES NOT DRINK

## 2024-04-16 ASSESSMENT — PAIN SCALES - GENERAL
PAINLEVEL_OUTOF10: 0 - NO PAIN

## 2024-04-16 ASSESSMENT — PAIN - FUNCTIONAL ASSESSMENT
PAIN_FUNCTIONAL_ASSESSMENT: 0-10
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
## 2024-04-16 NOTE — CARE PLAN
The patient's goals for the shift include  NA    The clinical goals for the shift include pt safety

## 2024-04-16 NOTE — PROGRESS NOTES
"Mela Kumari is a 83 y.o. female on day 0 of admission presenting with Dementia due to Parkinson's disease, unspecified dementia severity, unspecified whether behavioral, psychotic, or mood disturbance or anxiety (Multi).      Subjective   Pt. In bed, sitter at bedside feeding lunch. She was started on Rocephin for UTI and is doing well. No reports of agitation or combativeness from staff. rEEG was normal.  Review of systems are negative unless otherwise specified in HPI.         Objective     Last Recorded Vitals  Blood pressure 157/72, pulse 88, temperature 36.3 °C (97.3 °F), temperature source Temporal, resp. rate 18, height 1.5 m (4' 11.06\"), weight 60.2 kg (132 lb 11.5 oz), SpO2 90%.    Physical Exam  Eyes:      Pupils: Pupils are equal, round, and reactive to light.   Neurological:      Motor: Motor strength is normal.  Psychiatric:         Speech: Speech normal.       Neurological Exam  Mental Status   Oriented only to person and place. Speech is normal. Language is fluent with no aphasia. Attention and concentration are normal.    Cranial Nerves  CN III, IV, VI: Pupils equal round and reactive to light bilaterally.  And EOM's Normal.    Motor   Strength is 5/5 throughout all four extremities.    Sensory  Light touch is normal in upper and lower extremities.       Relevant Results  Results for orders placed or performed during the hospital encounter of 04/12/24 (from the past 24 hour(s))   Urinalysis with Reflex Microscopic   Result Value Ref Range    Color, Urine Yellow Straw, Yellow    Appearance, Urine Clear Clear    Specific Gravity, Urine 1.004 (N) 1.005 - 1.035    pH, Urine 7.0 5.0, 5.5, 6.0, 6.5, 7.0, 7.5, 8.0    Protein, Urine NEGATIVE NEGATIVE mg/dL    Glucose, Urine NEGATIVE NEGATIVE mg/dL    Blood, Urine SMALL (1+) (A) NEGATIVE    Ketones, Urine NEGATIVE NEGATIVE mg/dL    Bilirubin, Urine NEGATIVE NEGATIVE    Urobilinogen, Urine <2.0 <2.0 mg/dL    Nitrite, Urine NEGATIVE NEGATIVE    Leukocyte " Esterase, Urine LARGE (3+) (A) NEGATIVE   Urine Gray Tube   Result Value Ref Range    Extra Tube Hold for add-ons.    Microscopic Only, Urine   Result Value Ref Range    WBC, Urine >50 (A) 1-5, NONE /HPF    RBC, Urine 1-2 NONE, 1-2, 3-5 /HPF    Mucus, Urine 1+ Reference range not established. /LPF   POCT GLUCOSE   Result Value Ref Range    POCT Glucose 162 (H) 74 - 99 mg/dL   POCT GLUCOSE   Result Value Ref Range    POCT Glucose 165 (H) 74 - 99 mg/dL   POCT GLUCOSE   Result Value Ref Range    POCT Glucose 134 (H) 74 - 99 mg/dL   CBC   Result Value Ref Range    WBC 5.3 4.4 - 11.3 x10*3/uL    nRBC 0.0 0.0 - 0.0 /100 WBCs    RBC 3.18 (L) 4.00 - 5.20 x10*6/uL    Hemoglobin 10.4 (L) 12.0 - 16.0 g/dL    Hematocrit 30.4 (L) 36.0 - 46.0 %    MCV 96 80 - 100 fL    MCH 32.7 26.0 - 34.0 pg    MCHC 34.2 32.0 - 36.0 g/dL    RDW 13.1 11.5 - 14.5 %    Platelets 219 150 - 450 x10*3/uL   Comprehensive Metabolic Panel   Result Value Ref Range    Glucose 133 (H) 74 - 99 mg/dL    Sodium 141 136 - 145 mmol/L    Potassium 3.8 3.5 - 5.3 mmol/L    Chloride 109 (H) 98 - 107 mmol/L    Bicarbonate 27 21 - 32 mmol/L    Anion Gap 9 (L) 10 - 20 mmol/L    Urea Nitrogen 14 6 - 23 mg/dL    Creatinine 0.68 0.50 - 1.05 mg/dL    eGFR 88 >60 mL/min/1.73m*2    Calcium 7.9 (L) 8.6 - 10.3 mg/dL    Albumin 3.2 (L) 3.4 - 5.0 g/dL    Alkaline Phosphatase 78 33 - 136 U/L    Total Protein 5.5 (L) 6.4 - 8.2 g/dL    AST 14 9 - 39 U/L    Bilirubin, Total 0.4 0.0 - 1.2 mg/dL    ALT <3 (L) 7 - 45 U/L   Magnesium   Result Value Ref Range    Magnesium 1.45 (L) 1.60 - 2.40 mg/dL   SST TOP   Result Value Ref Range    Extra Tube Hold for add-ons.    POCT GLUCOSE   Result Value Ref Range    POCT Glucose 134 (H) 74 - 99 mg/dL   EEG    Result Date: 4/15/2024  IMPRESSION This routine EEG is normal. No epileptiform discharges or lateralizing signs are seen. However, clinical correlation is strongly recommended. This report has been interpreted and electronically signed by    Scheduled medications   Medication Dose Route Frequency    atorvastatin  20 mg oral Daily    carbidopa-levodopa  2 tablet oral 4x daily    cefTRIAXone  1 g intravenous q24h    DULoxetine  20 mg oral Daily    enoxaparin  40 mg subcutaneous q24h    entacapone  200 mg oral 4x daily    ferrous gluconate  38 mg of iron oral Daily with breakfast    gabapentin  300 mg oral Daily    insulin lispro  0-5 Units subcutaneous TID with meals    mirtazapine  7.5 mg oral Nightly    pantoprazole  40 mg oral Daily before breakfast    Or    pantoprazole  40 mg intravenous Daily before breakfast    polyethylene glycol  17 g oral Daily    sennosides-docusate sodium  1 tablet oral Daily    SITagliptin phosphate  50 mg oral Daily     PRN medications   Medication    acetaminophen    Or    acetaminophen    Or    acetaminophen    dextrose    dextrose    glucagon    glucagon    haloperidol lactate    hydrALAZINE    melatonin    ondansetron ODT    Or    ondansetron    traMADol                       [place] Coma Scale  Best Eye Response: Spontaneous  Best Verbal Response: Oriented  Best Motor Response: Follows commands  [place] Coma Scale Score: 15                             Assessment/Plan      Principal Problem:    Dementia due to Parkinson's disease, unspecified dementia severity, unspecified whether behavioral, psychotic, or mood disturbance or anxiety (Multi)    Impression:  Acute change in MS r/t UTI with exacerbation of PD dementia  History of HTN, CAD  Plan:   Continue treating underlying infection  PT/OT  Agree with plan for inpt. [facility] for dementia  Follow up with Dr. Schaeffer as oupt for PD    I have discussed the patient and the plan of care with the Neurologist on-call, Dr. Pate.       No further needs from neurology; okay for transfer or discharge as per primary team. Please contact if condition changes for re-eval.          I spent 35 minutes in the professional and overall care of this patient.      Carol Poole, APRN-CNP

## 2024-04-16 NOTE — PROGRESS NOTES
"Mela Kumari is a 83 y.o. female presenting with Parkinson's with dementia.    Subjective   Patient somewhat lethargic on assessment today.  Patient reports that her mood is improving.  Patient denying suicidal ideation today, but she cannot explain what was different from yesterday when she was endorsing suicidal ideation.  Patient less alert today, but did endorse experiencing suicidal thoughts for the past 2 months yesterday when she was more alert and oriented.  Overall patient episodes of agitation improved but did require PRN Haldol this afternoon.    Objective     MSE  General: Appropriately groomed and dressed.  Appearance: Appears stated age.  Attitude: Calm, cooperative.  Behavior: Appropriate eye contact.  Motor activity: No agitation or retardation. no EPS.  Normal gait.  Speech: Regular rate, rhythm, volume and tone.  Mood: Depressed  Affect: Flat  Thought process: Organized, linear, goal-directed.  Associations are logical.  Thought content: Denied suicidal ideation, endorsed experiencing suicidal ideation yesterday  Thought perception: Did not endorse auditory or visual hallucinations.  Cognition: Alert, oriented x3.  no deficit in memory or attention.  Insight: Poor  Judgment: Impaired    Last Recorded Vitals  /62 (BP Location: Right arm, Patient Position: Lying)   Pulse 74   Temp 36.3 °C (97.3 °F) (Temporal)   Resp 18   Ht 1.5 m (4' 11.06\")   Wt 60.2 kg (132 lb 11.5 oz)   SpO2 96%   BMI 26.76 kg/m²      Relevant Results  Scheduled medications  atorvastatin, 20 mg, oral, Daily  carbidopa-levodopa, 2 tablet, oral, 4x daily  cefTRIAXone, 1 g, intravenous, q24h  DULoxetine, 20 mg, oral, Daily  enoxaparin, 40 mg, subcutaneous, q24h  entacapone, 200 mg, oral, 4x daily  ferrous gluconate, 38 mg of iron, oral, Daily with breakfast  gabapentin, 300 mg, oral, Daily  insulin lispro, 0-5 Units, subcutaneous, TID with meals  mirtazapine, 7.5 mg, oral, Nightly  pantoprazole, 40 mg, oral, Daily " before breakfast   Or  pantoprazole, 40 mg, intravenous, Daily before breakfast  polyethylene glycol, 17 g, oral, Daily  sennosides-docusate sodium, 1 tablet, oral, Daily  SITagliptin phosphate, 50 mg, oral, Daily      Continuous medications     PRN medications  PRN medications: acetaminophen **OR** acetaminophen **OR** acetaminophen, dextrose, dextrose, glucagon, glucagon, haloperidol lactate, hydrALAZINE, melatonin, ondansetron ODT **OR** ondansetron, traMADol    Results for orders placed or performed during the hospital encounter of 04/12/24 (from the past 24 hour(s))   POCT GLUCOSE   Result Value Ref Range    POCT Glucose 165 (H) 74 - 99 mg/dL   POCT GLUCOSE   Result Value Ref Range    POCT Glucose 134 (H) 74 - 99 mg/dL   CBC   Result Value Ref Range    WBC 5.3 4.4 - 11.3 x10*3/uL    nRBC 0.0 0.0 - 0.0 /100 WBCs    RBC 3.18 (L) 4.00 - 5.20 x10*6/uL    Hemoglobin 10.4 (L) 12.0 - 16.0 g/dL    Hematocrit 30.4 (L) 36.0 - 46.0 %    MCV 96 80 - 100 fL    MCH 32.7 26.0 - 34.0 pg    MCHC 34.2 32.0 - 36.0 g/dL    RDW 13.1 11.5 - 14.5 %    Platelets 219 150 - 450 x10*3/uL   Comprehensive Metabolic Panel   Result Value Ref Range    Glucose 133 (H) 74 - 99 mg/dL    Sodium 141 136 - 145 mmol/L    Potassium 3.8 3.5 - 5.3 mmol/L    Chloride 109 (H) 98 - 107 mmol/L    Bicarbonate 27 21 - 32 mmol/L    Anion Gap 9 (L) 10 - 20 mmol/L    Urea Nitrogen 14 6 - 23 mg/dL    Creatinine 0.68 0.50 - 1.05 mg/dL    eGFR 88 >60 mL/min/1.73m*2    Calcium 7.9 (L) 8.6 - 10.3 mg/dL    Albumin 3.2 (L) 3.4 - 5.0 g/dL    Alkaline Phosphatase 78 33 - 136 U/L    Total Protein 5.5 (L) 6.4 - 8.2 g/dL    AST 14 9 - 39 U/L    Bilirubin, Total 0.4 0.0 - 1.2 mg/dL    ALT <3 (L) 7 - 45 U/L   Magnesium   Result Value Ref Range    Magnesium 1.45 (L) 1.60 - 2.40 mg/dL   SST TOP   Result Value Ref Range    Extra Tube Hold for add-ons.    POCT GLUCOSE   Result Value Ref Range    POCT Glucose 134 (H) 74 - 99 mg/dL   Drug Screen, Urine   Result Value Ref Range     Amphetamine Screen, Urine Presumptive Negative Presumptive Negative    Barbiturate Screen, Urine Presumptive Negative Presumptive Negative    Benzodiazepines Screen, Urine Presumptive Negative Presumptive Negative    Cannabinoid Screen, Urine Presumptive Negative Presumptive Negative    Cocaine Metabolite Screen, Urine Presumptive Negative Presumptive Negative    Fentanyl Screen, Urine Presumptive Negative Presumptive Negative    Opiate Screen, Urine Presumptive Negative Presumptive Negative    Oxycodone Screen, Urine Presumptive Negative Presumptive Negative    PCP Screen, Urine Presumptive Negative Presumptive Negative    Methadone Screen, Urine Presumptive Negative Presumptive Negative   Sars-CoV-2 PCR   Result Value Ref Range    Coronavirus 2019, PCR Not Detected Not Detected   POCT GLUCOSE   Result Value Ref Range    POCT Glucose 140 (H) 74 - 99 mg/dL   ECG 12 lead   Result Value Ref Range    Ventricular Rate 75 BPM    Atrial Rate 75 BPM    IA Interval 170 ms    QRS Duration 86 ms    QT Interval 400 ms    QTC Calculation(Bazett) 446 ms    P Axis 48 degrees    R Axis -23 degrees    T Axis -3 degrees    QRS Count 12 beats    Q Onset 214 ms    P Onset 129 ms    P Offset 175 ms    T Offset 414 ms    QTC Fredericia 430 ms        Assessment/Plan   Diagnosis:  Parkinson's with dementia, depression    Recommendations:  -- Patient DOES meet criteria for inpatient geriatric psychiatric admission. Once patient is deemed medically cleared, please document clearly in note that patient is MEDICALLY CLEARED and contact EPAT.     -- Medications:           -Continue Remeron and Cymbalta     -- Discussed recommendations with primary team.     Jeovany Mohamud, KAREN-CNP

## 2024-04-16 NOTE — SIGNIFICANT EVENT
Insomnia  Dementia w behaviors  - placement in memory care unit v inpatient [facility]???   - 1:1 sitter    UTI  Dehydration  - Start Ceftriaxone 1g daily, and plan for po meds with transitional care to facility
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
## 2024-04-16 NOTE — PROGRESS NOTES
Occupational Therapy                 Therapy Communication Note    Patient Name: Mela Kumari  MRN: [id number]  Today's Date: 4/16/2024     Discipline: Occupational Therapy    Missed Visit Reason: Missed Visit Reason:  (pt not appropriate for OT tx, per RN pt given haldol and unable to arouse. will reattempt as schedule allows)    Missed Time: Attempt    Comment:

## 2024-04-16 NOTE — PROGRESS NOTES
Physical Therapy                 Therapy Communication Note    Patient Name: Mela Kumari  MRN: [id number]  Today's Date: 4/16/2024     Discipline: Physical Therapy    Missed Visit Reason: Missed Visit Reason:  (Patient unable to be roused at this time)    Missed Time: Attempt    Comment:  9:59  Per nursing patient was medicated last night with Haldol as she was becoming increasingly agitated and combative.  Patient now sleeping, unable to be roused despite attempts.  Not appropriate for PT at this time.  Will re-attempt next visit as able

## 2024-04-16 NOTE — CARE PLAN
The patient's goals for the shift include pain managment     The clinical goals for the shift include pt to remain safe and free from harm    Over the shift, the patient did not make progress toward the following goals. Barriers to progression include neuropathy. Recommendations to address these barriers include administer medications as ordered, continue sitter for safety.      Problem: Skin  Goal: Decreased wound size/increased tissue granulation at next dressing change  Outcome: Progressing  Flowsheets (Taken 4/16/2024 0056)  Decreased wound size/increased tissue granulation at next dressing change:   Promote sleep for wound healing   Protective dressings over bony prominences  Goal: Participates in plan/prevention/treatment measures  Outcome: Progressing  Flowsheets (Taken 4/16/2024 0056)  Participates in plan/prevention/treatment measures:   Discuss with provider PT/OT consult   Elevate heels   Increase activity/out of bed for meals  Goal: Prevent/manage excess moisture  Outcome: Progressing  Flowsheets (Taken 4/16/2024 0056)  Prevent/manage excess moisture:   Cleanse incontinence/protect with barrier cream   Monitor for/manage infection if present   Follow provider orders for dressing changes   Moisturize dry skin  Goal: Prevent/minimize sheer/friction injuries  Outcome: Progressing  Flowsheets (Taken 4/16/2024 0056)  Prevent/minimize sheer/friction injuries:   Increase activity/out of bed for meals   Use pull sheet   HOB 30 degrees or less   Turn/reposition every 2 hours/use positioning/transfer devices  Goal: Promote/optimize nutrition  Outcome: Progressing  Flowsheets (Taken 4/16/2024 0056)  Promote/optimize nutrition:   Monitor/record intake including meals   Consume > 50% meals/supplements   Offer water/supplements/favorite foods  Goal: Promote skin healing  Outcome: Progressing  Flowsheets (Taken 4/16/2024 0056)  Promote skin healing:   Protective dressings over bony prominences   Turn/reposition every 2  hours/use positioning/transfer devices   Assess skin/pad under line(s)/device(s)   Rotate device position/do not position patient on device     Problem: Diabetes  Goal: Increase stability of blood glucose readings by end of shift  Outcome: Progressing  Goal: Decrease in ketones present in urine by end of shift  Outcome: Progressing  Goal: Maintain electrolyte levels within acceptable range throughout shift  Outcome: Progressing  Goal: Maintain glucose levels >70mg/dl to <250mg/dl throughout shift  Outcome: Progressing  Goal: No changes in neurological exam by end of shift  Outcome: Progressing  Goal: Vital signs within normal range for age by end of shift  Outcome: Progressing     Problem: Pain - Adult  Goal: Verbalizes/displays adequate comfort level or baseline comfort level  Outcome: Progressing     Problem: Safety - Adult  Goal: Free from fall injury  Outcome: Progressing     Problem: Discharge Planning  Goal: Discharge to home or other facility with appropriate resources  Outcome: Progressing     Problem: Fall/Injury  Goal: Not fall by end of shift  Outcome: Progressing  Goal: Be free from injury by end of the shift  Outcome: Progressing  Goal: Verbalize understanding of personal risk factors for fall in the hospital  Outcome: Progressing  Goal: Verbalize understanding of risk factor reduction measures to prevent injury from fall in the home  Outcome: Progressing  Goal: Use assistive devices by end of the shift  Outcome: Progressing  Goal: Pace activities to prevent fatigue by end of the shift  Outcome: Progressing

## 2024-04-17 VITALS
DIASTOLIC BLOOD PRESSURE: 54 MMHG | RESPIRATION RATE: 16 BRPM | HEIGHT: 59 IN | HEART RATE: 96 BPM | BODY MASS INDEX: 26.76 KG/M2 | TEMPERATURE: 98.6 F | WEIGHT: 132.72 LBS | SYSTOLIC BLOOD PRESSURE: 104 MMHG | OXYGEN SATURATION: 95 %

## 2024-04-17 LAB
GLUCOSE BLD MANUAL STRIP-MCNC: 142 MG/DL (ref 74–99)
GLUCOSE BLD MANUAL STRIP-MCNC: 215 MG/DL (ref 74–99)

## 2024-04-17 PROCEDURE — 2500000001 HC RX 250 WO HCPCS SELF ADMINISTERED DRUGS (ALT 637 FOR MEDICARE OP)

## 2024-04-17 PROCEDURE — 99238 HOSP IP/OBS DSCHRG MGMT 30/<: CPT | Performed by: STUDENT IN AN ORGANIZED HEALTH CARE EDUCATION/TRAINING PROGRAM

## 2024-04-17 PROCEDURE — 97535 SELF CARE MNGMENT TRAINING: CPT | Mod: GO,CO

## 2024-04-17 PROCEDURE — 82947 ASSAY GLUCOSE BLOOD QUANT: CPT

## 2024-04-17 PROCEDURE — 2500000004 HC RX 250 GENERAL PHARMACY W/ HCPCS (ALT 636 FOR OP/ED)

## 2024-04-17 PROCEDURE — 2500000001 HC RX 250 WO HCPCS SELF ADMINISTERED DRUGS (ALT 637 FOR MEDICARE OP): Performed by: HOSPITALIST

## 2024-04-17 PROCEDURE — 2500000006 HC RX 250 W HCPCS SELF ADMINISTERED DRUGS (ALT 637 FOR ALL PAYERS): Performed by: HOSPITALIST

## 2024-04-17 PROCEDURE — 2500000004 HC RX 250 GENERAL PHARMACY W/ HCPCS (ALT 636 FOR OP/ED): Performed by: NURSE PRACTITIONER

## 2024-04-17 PROCEDURE — 97530 THERAPEUTIC ACTIVITIES: CPT | Mod: GP,CQ

## 2024-04-17 RX ADMIN — ENTACAPONE 200 MG: 200 TABLET ORAL at 12:06

## 2024-04-17 RX ADMIN — INSULIN LISPRO 2 UNITS: 100 INJECTION, SOLUTION INTRAVENOUS; SUBCUTANEOUS at 11:48

## 2024-04-17 RX ADMIN — CEFTRIAXONE SODIUM 1 G: 1 INJECTION, SOLUTION INTRAVENOUS at 06:27

## 2024-04-17 RX ADMIN — FERROUS GLUCONATE 38 MG OF IRON: 324 TABLET ORAL at 10:45

## 2024-04-17 RX ADMIN — PANTOPRAZOLE SODIUM 40 MG: 40 TABLET, DELAYED RELEASE ORAL at 06:27

## 2024-04-17 RX ADMIN — SITAGLIPTIN 50 MG: 25 TABLET, FILM COATED ORAL at 10:45

## 2024-04-17 RX ADMIN — ENTACAPONE 200 MG: 200 TABLET ORAL at 06:27

## 2024-04-17 RX ADMIN — ENOXAPARIN SODIUM 40 MG: 40 INJECTION SUBCUTANEOUS at 13:32

## 2024-04-17 RX ADMIN — DULOXETINE HYDROCHLORIDE 20 MG: 20 CAPSULE, DELAYED RELEASE ORAL at 10:45

## 2024-04-17 RX ADMIN — GABAPENTIN 300 MG: 300 CAPSULE ORAL at 10:45

## 2024-04-17 RX ADMIN — ATORVASTATIN CALCIUM 20 MG: 20 TABLET, FILM COATED ORAL at 10:45

## 2024-04-17 RX ADMIN — CARBIDOPA AND LEVODOPA 2 TABLET: 25; 100 TABLET ORAL at 12:06

## 2024-04-17 RX ADMIN — CARBIDOPA AND LEVODOPA 2 TABLET: 25; 100 TABLET ORAL at 06:27

## 2024-04-17 RX ADMIN — DOCUSATE SODIUM 50MG AND SENNOSIDES 8.6MG 1 TABLET: 8.6; 5 TABLET, FILM COATED ORAL at 10:45

## 2024-04-17 ASSESSMENT — PAIN - FUNCTIONAL ASSESSMENT
PAIN_FUNCTIONAL_ASSESSMENT: 0-10

## 2024-04-17 ASSESSMENT — COGNITIVE AND FUNCTIONAL STATUS - GENERAL
TOILETING: A LOT
DRESSING REGULAR LOWER BODY CLOTHING: A LOT
MOVING FROM LYING ON BACK TO SITTING ON SIDE OF FLAT BED WITH BEDRAILS: A LOT
DAILY ACTIVITIY SCORE: 15
PERSONAL GROOMING: A LITTLE
TURNING FROM BACK TO SIDE WHILE IN FLAT BAD: A LOT
HELP NEEDED FOR BATHING: A LOT
DRESSING REGULAR UPPER BODY CLOTHING: A LITTLE
WALKING IN HOSPITAL ROOM: A LOT
MOBILITY SCORE: 11
STANDING UP FROM CHAIR USING ARMS: A LOT
CLIMB 3 TO 5 STEPS WITH RAILING: TOTAL
EATING MEALS: A LITTLE
MOVING TO AND FROM BED TO CHAIR: A LOT

## 2024-04-17 ASSESSMENT — PAIN SCALES - GENERAL
PAINLEVEL_OUTOF10: 0 - NO PAIN
PAINLEVEL_OUTOF10: 0 - NO PAIN
PAINLEVEL_OUTOF10: 9

## 2024-04-17 ASSESSMENT — ACTIVITIES OF DAILY LIVING (ADL): HOME_MANAGEMENT_TIME_ENTRY: 10

## 2024-04-17 NOTE — PROGRESS NOTES
Occupational Therapy    OT Treatment    Patient Name: Mela Kumari  MRN: [id number]  Today's Date: 4/17/2024  Time Calculation  Start Time: 1035  Stop Time: 1045  Time Calculation (min): 10 min         Assessment:  OT Assessment: Patient limited by impaired balance, decreased endurance, decreased strength and impaired cognition.  Prognosis: Fair  Evaluation/Treatment Tolerance: Patient limited by fatigue  Medical Staff Made Aware: Yes  End of Session Communication: PCT/NA/CTA, Bedside nurse  End of Session Patient Position: Up in chair, Alarm on (caregiver present.  Call light and tray within reach.  All needs met0)  Prognosis: Fair  Evaluation/Treatment Tolerance: Patient limited by fatigue  Medical Staff Made Aware: Yes  Plan:  Treatment Interventions: ADL retraining  OT Frequency: 3 times per week  OT Discharge Recommendations: 24 hr supervision due to cognition  Equipment Recommended upon Discharge: Wheeled walker  Treatment Interventions: ADL retraining    Subjective   Previous Visit Info:  OT Last Visit  OT Received On: 04/17/24  General:  General  Prior to Session Communication: Bedside nurse (cleared for participation by RN)  Patient Position Received: Up in chair, Alarm on (caregiver present; PT finishing tx session.)  General Comment: Pt agreeable to OT, pleasantly confused and cooperative.  Precautions:  Medical Precautions: Fall precautions  Vital Signs:     Pain:       Objective    Cognition:  Cognition  Overall Cognitive Status: Impaired (Flat affect)  Orientation Level: Disoriented to situation, Disoriented to time  Impulsive: Moderately  Processing Speed: Delayed       Activities of Daily Living:      LE Dressing  LE Dressing: Yes  Pants Level of Assistance: Moderate assistance  LE Dressing Where Assessed:  (bedside commode)  LE Dressing Comments: Assist to thread LE's; assist to boost and steady for clothing management.  Assist to fully pull clothing up over hips.  Education provided on fall  prevention with fair return demonstration.    Toileting  Toileting Level of Assistance: Moderate assistance  Where Assessed: Bedside commode  Toileting Comments: Pt performed pericare and clothing management in stance with fair- balance with 1 UE support.  Assist to steady and complete pericare thoroughly.       Bed Mobility/Transfers: Transfers  Transfer: Yes (sit to stand x 2 trials (chair/BSC) with max assist.  Retropulsive with transitional movements.)  Transfer 1  Trials/Comments 1: sit to stand x 2 trials (chair/BSC) with max assist. Retropulsive with transitional movements.  Transfers 2  Trials/Comments 2: BSC to chair with fww and mod assist x 2    Therapy/Activity: Balance/Neuromuscular Re-Education  Balance/Neuromuscular Re-Education Activity Performed: Yes  Balance/Neuromuscular Re-Education Activity 1: Standing trial with sustained balance ~ 1 min  with B/L UE support with fair-/poor + balance.  Balance/Neuromuscular Re-Education Activity 2: Static/dyn standing with 1-2 UE support. Performed to improve balance and tolerance for increased independence with adl's and transfers.    Outcome Measures:[facility] Daily Activity  Putting on and taking off regular lower body clothing: A lot  Bathing (including washing, rinsing, drying): A lot  Putting on and taking off regular upper body clothing: A little  Toileting, which includes using toilet, bedpan or urinal: A lot  Taking care of personal grooming such as brushing teeth: A little  Eating Meals: A little  Daily Activity - Total Score: 15        Education Documentation  ADL Training, taught by Jennifer L Felty, OTA at 4/17/2024  3:06 PM.  Learner: Patient  Readiness: Acceptance  Method: Explanation, Demonstration  Response: Needs Reinforcement    EDUCATION: fall prevention; safe transfer techniques; compensatory adl techniques.       Goals:  Encounter Problems       Encounter Problems (Active)       OT Goals       OT Goal 1 (Progressing)       Start:  04/14/24     Expected End:  04/28/24       Complete all UB/LB bathing and dressing tasks with CGA/Min A         OT Goal 2 (Progressing)       Start:  04/14/24    Expected End:  04/28/24       Compete all transfers for ADLs/functional tasks with Min A with FWW         OT Goal 3 (Progressing)       Start:  04/14/24    Expected End:  04/28/24       Complete all toileting tasks with SBA/CGA
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
## 2024-04-17 NOTE — SIGNIFICANT EVENT
Application for Emergency Admission      Ready for Transfer?  Is the patient medically cleared for transfer to inpatient psychiatry: Yes  Has the patient been accepted to an inpatient psychiatric hospital: Yes    Application for Emergency Admission  IN ACCORDANCE [place]122[place]10 [place]  The Chief Clinical Officer [facility]: [facility] [place] 4/17/2024 .12:25 PM    Reason for Hospitalization  The undersigned has reason to believe that: Mela Kumari Is a mentally ill person subject to hospitalization by court order under division B Section 5122.01 of the Revised Code, i.e., this person:    1.Yes  Represents a substantial risk of physical harm to self as manifested by evidence of threats of, or attempts at, suicide or serious self-inflicted bodily harm    2.Yes Represents a substantial risk of physical harm to others as manifested by evidence of recent homicidal or other violent behavior, evidence of recent threats that place another in reasonable fear of violent behavior and serious physical harm, or other evidence of present dangerousness    3.Yes Represents a substantial and immediate risk of serious physical impairment or injury to self as manifested by  evidence that the person is unable to provide for and is not providing for the person's basic physical needs because of the person's mental illness and that appropriate provision for those needs cannot be made  immediately available in the community    4.Yes Would benefit from treatment in a hospital for his mental illness and is in need of such treatment as manifested by evidence of behavior that creates a grave and imminent risk to substantial rights of others or  himself.    5.Yes Would benefit from treatment as manifested by evidence of behavior that indicates all of the following:       (a) The person is unlikely to survive safely in the community without supervision, based on a clinical determination.       (b) The person has a history of lack  of compliance with treatment for mental illness and one of the following applies:      (i) At least twice within the thirty-six months prior to the filing of an affidavit seeking court-ordered treatment of the person under section 5122.111 of the Revised Code, the lack of compliance has been a significant factor in necessitating hospitalization in a hospital or receipt of services in a forensic or other mental health unit of a correctional facility, provided that the thirty-six-month period shall be extended by the length of any hospitalization or incarceration of the person that occurred within the thirty-six-month period.      (ii) Within the forty-eight months prior to the filing of an affidavit seeking court-ordered treatment of the person under section 5122.111 of the Revised Code, the lack of compliance resulted in one or more acts of serious violent behavior toward self or others or threats of, or attempts at, serious physical harm to self or others, provided that the forty-eight-month period shall be extended by the length of any hospitalization or incarceration of the person that occurred within the forty-eight-month period.      (c) The person, as a result of mental illness, is unlikely to voluntarily participate in necessary treatment.       (d) In view of the person's treatment history and current behavior, the person is in need of treatment in order to prevent a relapse or deterioration that would be likely to result in substantial risk of serious harm to the person or others.    (e) Represents a substantial risk of physical harm to self or others if allowed to remain at liberty pending examination.    Therefore, it is requested that said person be admitted to the above named facility.    STATEMENT OF BELIEF    Must be filled out by one of the following: a psychiatrist, licensed physician, licensed clinical psychologist, health or ,  or .  (Statement shall include the  circumstances under which the individual was taken into custody and the reason for the person's belief that hospitalization is necessary. The statement shall also include a reference to efforts made to secure the individual's property at his residence if he was taken into custody there. Every reasonable and appropriate effort should be made to take this person into custody in the least conspicuous manner possible.)    Patient requires inpatient geriatric psych admission due to recent agitation secondary to Parkinson's with dementia.  Patient also expressed suicidal ideation that she has been experiencing over the past 2 months.  At this time patient is acute risk of harm to self and others.    Jeovany Mohamud, KAREN-CNP 4/17/2024     _____________________________________________________________   Place of Employment: [facility]    STATEMENT OF OBSERVATION BY PSYCHIATRIST, LICENSED PHYSICIAN, OR LICENSED CLINICAL PSYCHOLOGIST, IF APPLICABLE    Place of Observation (e.g., [facility], general hospital, office, emergency facility)  (If applicable, please complete)    KAREN Martinez-VASILIY 4/17/2024    _____________________________________________________________

## 2024-04-17 NOTE — CARE PLAN
The patient's goals for the shift include      The clinical goals for the shift include remain safe and free from harm

## 2024-04-18 LAB — BACTERIA UR CULT: ABNORMAL

## 2024-04-21 NOTE — DOCUMENTATION CLARIFICATION NOTE
"    PATIENT:               SIDNEY ARMSTRONG  ACCT #:                  [id number]  MRN:                       [id number]  :                       1940  ADMIT DATE:       2024 11:20 PM  DISCH DATE:        2024 3:10 PM  RESPONDING PROVIDER #:        [id number]          PROVIDER RESPONSE TEXT:    Metabolic Encephalopathy 2/2 UTI superimposed on Parkinson's dementia    CDI QUERY TEXT:    Clarification    Instruction:    Based on your assessment of the patient and the clinical information, please provide the requested documentation by clicking on the appropriate radio button and enter any additional information if prompted.    Question: Please further clarify the most likely etiology of the altered mental status as    When answering this query, please exercise your independent professional judgment. The fact that a question is being asked, does not imply that any particular answer is desired or expected.    The patient's clinical indicators include:  Clinical Information: 82 yo female admitted with Parkinson dementia with increase in aggressive behavior.    Clinical Indicators:  GCS: 11 ( ED)  GCS: 12 (4/15 Neuro consult)  GCS: 15 ( PN)     PN Dr. Donald \"Acute encephalopathy due to UTI  Acute cystitis Continue Rocephin switch to p.o. on discharge Follow culture result\"    4/15 Neuro consult Dr. Pate: \"acute change in mental status most likely acute encephalopathy probably worsening of existing Parkinson dementia syndrome needs to rule out UTI or other treatable causes. EEG was done which did not show any spike-wave discharges\"    4/15 PN Dr. Weiss: \"patient more awake and conversant compared to yesterday per reports\"    Treatment: Rocephin IV, Neuro consult, psych consult    Risk Factors: dementia, encephalopathy, UTI  Options provided:  -- Metabolic Encephalopathy 2/2 UTI superimposed on Parkinson's dementia  -- Worsening dementia  -- Other - I will add my own diagnosis  -- Refer to Clinical " Documentation Reviewer    Query created by: Nancy Bhat on 4/16/2024 2:14 PM      Electronically signed by:  HUSSEIN SAUL MD 4/21/2024 4:53 PM

## 2024-04-25 LAB
ATRIAL RATE: 75 BPM
P AXIS: 48 DEGREES
P OFFSET: 175 MS
P ONSET: 129 MS
PR INTERVAL: 170 MS
Q ONSET: 214 MS
QRS COUNT: 12 BEATS
QRS DURATION: 86 MS
QT INTERVAL: 400 MS
QTC CALCULATION(BAZETT): 446 MS
QTC FREDERICIA: 430 MS
R AXIS: -23 DEGREES
T AXIS: -3 DEGREES
T OFFSET: 414 MS
VENTRICULAR RATE: 75 BPM

## 2024-04-30 ENCOUNTER — TELEPHONE (OUTPATIENT)
Dept: PRIMARY CARE | Facility: CLINIC | Age: 84
End: 2024-04-30
Payer: MEDICARE

## 2024-04-30 NOTE — TELEPHONE ENCOUNTER
Paperwork was faxed to us to be filled out for an H&P from [facility] for Mela. I called Mariela,  and explained that we have not seen pt since 7/2023 and sons had expressed that she was not going to be returning to our office d/t difficulty bringing her in. She was to see the provider at the nursing home. I explained this to her and she noted that and will have the appropriate provider do her H&P for the admission to them.

## 2024-09-05 ENCOUNTER — DOCUMENTATION (OUTPATIENT)
Dept: PRIMARY CARE | Facility: CLINIC | Age: 84
End: 2024-09-05
Payer: MEDICARE

## 2024-10-23 ENCOUNTER — INPATIENT HOSPITAL (OUTPATIENT)
Dept: URBAN - METROPOLITAN AREA HOSPITAL 50 | Facility: HOSPITAL | Age: 84
End: 2024-10-23
Payer: MEDICARE

## 2024-10-23 DIAGNOSIS — D52.9 FOLATE DEFICIENCY ANEMIA, UNSPECIFIED: ICD-10-CM

## 2024-10-23 DIAGNOSIS — K94.23 GASTROSTOMY MALFUNCTION: ICD-10-CM

## 2024-10-23 DIAGNOSIS — R19.7 DIARRHEA, UNSPECIFIED: ICD-10-CM

## 2024-10-23 DIAGNOSIS — K57.30 DIVERTICULOSIS OF LARGE INTESTINE WITHOUT PERFORATION OR ABS: ICD-10-CM

## 2024-10-23 DIAGNOSIS — R74.8 ABNORMAL LEVELS OF OTHER SERUM ENZYMES: ICD-10-CM

## 2024-10-23 PROCEDURE — 99222 1ST HOSP IP/OBS MODERATE 55: CPT | Performed by: INTERNAL MEDICINE

## 2024-10-24 ENCOUNTER — INPATIENT HOSPITAL (OUTPATIENT)
Dept: URBAN - METROPOLITAN AREA HOSPITAL 50 | Facility: HOSPITAL | Age: 84
End: 2024-10-24
Payer: MEDICARE

## 2024-10-24 DIAGNOSIS — D52.9 FOLATE DEFICIENCY ANEMIA, UNSPECIFIED: ICD-10-CM

## 2024-10-24 DIAGNOSIS — R19.7 DIARRHEA, UNSPECIFIED: ICD-10-CM

## 2024-10-24 DIAGNOSIS — K94.23 GASTROSTOMY MALFUNCTION: ICD-10-CM

## 2024-10-24 DIAGNOSIS — K57.30 DIVERTICULOSIS OF LARGE INTESTINE WITHOUT PERFORATION OR ABS: ICD-10-CM

## 2024-10-24 DIAGNOSIS — R74.8 ABNORMAL LEVELS OF OTHER SERUM ENZYMES: ICD-10-CM

## 2024-10-24 PROCEDURE — 99233 SBSQ HOSP IP/OBS HIGH 50: CPT | Mod: FS | Performed by: NURSE PRACTITIONER

## 2024-10-25 ENCOUNTER — INPATIENT HOSPITAL (OUTPATIENT)
Dept: URBAN - METROPOLITAN AREA HOSPITAL 50 | Facility: HOSPITAL | Age: 84
End: 2024-10-25
Payer: MEDICARE

## 2024-10-25 DIAGNOSIS — R74.8 ABNORMAL LEVELS OF OTHER SERUM ENZYMES: ICD-10-CM

## 2024-10-25 DIAGNOSIS — K57.30 DIVERTICULOSIS OF LARGE INTESTINE WITHOUT PERFORATION OR ABS: ICD-10-CM

## 2024-10-25 DIAGNOSIS — K94.23 GASTROSTOMY MALFUNCTION: ICD-10-CM

## 2024-10-25 DIAGNOSIS — R19.7 DIARRHEA, UNSPECIFIED: ICD-10-CM

## 2024-10-25 DIAGNOSIS — D52.9 FOLATE DEFICIENCY ANEMIA, UNSPECIFIED: ICD-10-CM

## 2024-10-25 PROCEDURE — 99233 SBSQ HOSP IP/OBS HIGH 50: CPT | Mod: FS | Performed by: NURSE PRACTITIONER

## 2024-10-26 PROCEDURE — 99233 SBSQ HOSP IP/OBS HIGH 50: CPT | Mod: FS | Performed by: NURSE PRACTITIONER

## 2024-10-27 PROCEDURE — 99233 SBSQ HOSP IP/OBS HIGH 50: CPT | Mod: FS | Performed by: NURSE PRACTITIONER

## 2024-10-28 ENCOUNTER — INPATIENT HOSPITAL (OUTPATIENT)
Dept: URBAN - METROPOLITAN AREA HOSPITAL 50 | Facility: HOSPITAL | Age: 84
End: 2024-10-28
Payer: MEDICARE

## 2024-10-28 DIAGNOSIS — K57.30 DIVERTICULOSIS OF LARGE INTESTINE WITHOUT PERFORATION OR ABS: ICD-10-CM

## 2024-10-28 DIAGNOSIS — R19.7 DIARRHEA, UNSPECIFIED: ICD-10-CM

## 2024-10-28 DIAGNOSIS — K94.23 GASTROSTOMY MALFUNCTION: ICD-10-CM

## 2024-10-28 DIAGNOSIS — D52.9 FOLATE DEFICIENCY ANEMIA, UNSPECIFIED: ICD-10-CM

## 2024-10-28 DIAGNOSIS — R74.8 ABNORMAL LEVELS OF OTHER SERUM ENZYMES: ICD-10-CM

## 2024-10-28 PROCEDURE — 99233 SBSQ HOSP IP/OBS HIGH 50: CPT | Mod: FS | Performed by: CLINICAL NURSE SPECIALIST

## 2024-10-29 PROCEDURE — 99233 SBSQ HOSP IP/OBS HIGH 50: CPT | Mod: FS | Performed by: NURSE PRACTITIONER

## 2024-10-30 PROCEDURE — 99233 SBSQ HOSP IP/OBS HIGH 50: CPT | Mod: FS | Performed by: CLINICAL NURSE SPECIALIST

## 2024-10-31 ENCOUNTER — INPATIENT HOSPITAL (OUTPATIENT)
Dept: URBAN - METROPOLITAN AREA HOSPITAL 50 | Facility: HOSPITAL | Age: 84
End: 2024-10-31
Payer: MEDICARE

## 2024-10-31 DIAGNOSIS — D52.9 FOLATE DEFICIENCY ANEMIA, UNSPECIFIED: ICD-10-CM

## 2024-10-31 DIAGNOSIS — K94.23 GASTROSTOMY MALFUNCTION: ICD-10-CM

## 2024-10-31 DIAGNOSIS — R74.8 ABNORMAL LEVELS OF OTHER SERUM ENZYMES: ICD-10-CM

## 2024-10-31 DIAGNOSIS — R19.7 DIARRHEA, UNSPECIFIED: ICD-10-CM

## 2024-10-31 DIAGNOSIS — K57.30 DIVERTICULOSIS OF LARGE INTESTINE WITHOUT PERFORATION OR ABS: ICD-10-CM

## 2024-10-31 PROCEDURE — 99233 SBSQ HOSP IP/OBS HIGH 50: CPT | Mod: FS | Performed by: CLINICAL NURSE SPECIALIST
